# Patient Record
Sex: FEMALE | Race: BLACK OR AFRICAN AMERICAN | Employment: FULL TIME | URBAN - METROPOLITAN AREA
[De-identification: names, ages, dates, MRNs, and addresses within clinical notes are randomized per-mention and may not be internally consistent; named-entity substitution may affect disease eponyms.]

---

## 2017-07-08 ENCOUNTER — OFFICE VISIT (OUTPATIENT)
Dept: OBGYN CLINIC | Facility: CLINIC | Age: 57
End: 2017-07-08

## 2017-07-08 VITALS
HEART RATE: 76 BPM | WEIGHT: 203 LBS | HEIGHT: 63.5 IN | DIASTOLIC BLOOD PRESSURE: 71 MMHG | BODY MASS INDEX: 35.52 KG/M2 | SYSTOLIC BLOOD PRESSURE: 106 MMHG

## 2017-07-08 DIAGNOSIS — Z01.419 ENCOUNTER FOR GYNECOLOGICAL EXAMINATION WITHOUT ABNORMAL FINDING: Primary | ICD-10-CM

## 2017-07-08 DIAGNOSIS — Z12.31 VISIT FOR SCREENING MAMMOGRAM: ICD-10-CM

## 2017-07-08 PROCEDURE — 99396 PREV VISIT EST AGE 40-64: CPT | Performed by: OBSTETRICS & GYNECOLOGY

## 2017-07-08 NOTE — PROGRESS NOTES
Jazmin Fraser is a 49year old female [id number] No LMP recorded. Patient is postmenopausal. Patient presents with:  Gyn Exam: Annual and Mammo order  .     OBSTETRICS HISTORY:  OB History    Para Term  AB Living   7 7 7   7   SAB TAB Ectopic Mul current outpatient prescriptions on file.     ALLERGIES:    Sulfa Antibiotics             Review of Systems:  Constitutional:  Denies fatigue, night sweats, hot flashes  Eyes:  denies blurred or double vision  Cardiovascular:  denies chest pain or palpitati visit:    Encounter for gynecological examination without abnormal finding    Visit for screening mammogram  -     [facility] SCREENING BILAT (CPT=77067); Future      Next cotest 4/19. Mammogram ordered. Annual visits.

## 2017-07-31 ENCOUNTER — HOSPITAL ENCOUNTER (OUTPATIENT)
Dept: MAMMOGRAPHY | Facility: HOSPITAL | Age: 57
Discharge: HOME | End: 2017-07-31
Attending: OBSTETRICS & GYNECOLOGY
Payer: COMMERCIAL

## 2017-07-31 DIAGNOSIS — Z12.31 VISIT FOR SCREENING MAMMOGRAM: ICD-10-CM

## 2017-07-31 PROCEDURE — 77067 SCR MAMMO BI INCL CAD: CPT | Performed by: OBSTETRICS & GYNECOLOGY

## 2017-12-02 ENCOUNTER — LAB ENCOUNTER (OUTPATIENT)
Dept: LAB | Facility: HOSPITAL | Age: 57
End: 2017-12-02
Attending: INTERNAL MEDICINE
Payer: COMMERCIAL

## 2017-12-02 DIAGNOSIS — Z00.00 ANNUAL PHYSICAL EXAM: Primary | ICD-10-CM

## 2017-12-02 PROCEDURE — 85060 BLOOD SMEAR INTERPRETATION: CPT

## 2017-12-02 PROCEDURE — 80061 LIPID PANEL: CPT

## 2017-12-02 PROCEDURE — 85025 COMPLETE CBC W/AUTO DIFF WBC: CPT

## 2017-12-02 PROCEDURE — 84443 ASSAY THYROID STIM HORMONE: CPT

## 2017-12-02 PROCEDURE — 80053 COMPREHEN METABOLIC PANEL: CPT

## 2017-12-02 PROCEDURE — 36415 COLL VENOUS BLD VENIPUNCTURE: CPT

## 2018-07-07 ENCOUNTER — OFFICE VISIT (OUTPATIENT)
Dept: OBGYN CLINIC | Facility: CLINIC | Age: 58
End: 2018-07-07

## 2018-07-07 VITALS
DIASTOLIC BLOOD PRESSURE: 70 MMHG | BODY MASS INDEX: 29 KG/M2 | HEART RATE: 73 BPM | SYSTOLIC BLOOD PRESSURE: 108 MMHG | WEIGHT: 168 LBS

## 2018-07-07 DIAGNOSIS — Z12.31 VISIT FOR SCREENING MAMMOGRAM: ICD-10-CM

## 2018-07-07 DIAGNOSIS — Z01.419 ENCOUNTER FOR GYNECOLOGICAL EXAMINATION WITHOUT ABNORMAL FINDING: Primary | ICD-10-CM

## 2018-07-07 PROCEDURE — 99396 PREV VISIT EST AGE 40-64: CPT | Performed by: OBSTETRICS & GYNECOLOGY

## 2018-07-07 NOTE — PROGRESS NOTES
Ge Walters is a 56year old female [id number] No LMP recorded. Patient is postmenopausal. Patient presents with:  Gyn Exam: Annual, Mammogram order -- wishes for new PCP recs. No change in hx. No  bleed  .     OBSTETRICS HISTORY:  OB History    Pa Other      CAD, family h/o, F, M, S x 2       MEDICATIONS:  No current outpatient prescriptions on file.     ALLERGIES:    Sulfa Antibiotics             Review of Systems:  Constitutional:    denies fatigue, night sweats, hot flashes  Eyes:     denies blurr normal without masses or tenderness  Perineum:   normal  Anus: no hemorroids     Assessment & Plan:  Esthela Caban was seen today for gyn exam.    Diagnoses and all orders for this visit:    Encounter for gynecological examination without abnormal finding    Visi

## 2018-07-15 ENCOUNTER — APPOINTMENT (OUTPATIENT)
Dept: GENERAL RADIOLOGY | Age: 58
End: 2018-07-15
Attending: NURSE PRACTITIONER
Payer: COMMERCIAL

## 2018-07-15 ENCOUNTER — HOSPITAL ENCOUNTER (OUTPATIENT)
Age: 58
Discharge: HOME | End: 2018-07-15
Payer: COMMERCIAL

## 2018-07-15 VITALS
DIASTOLIC BLOOD PRESSURE: 63 MMHG | RESPIRATION RATE: 18 BRPM | TEMPERATURE: 97 F | SYSTOLIC BLOOD PRESSURE: 115 MMHG | OXYGEN SATURATION: 100 %

## 2018-07-15 DIAGNOSIS — S16.1XXA STRAIN OF NECK MUSCLE, INITIAL ENCOUNTER: ICD-10-CM

## 2018-07-15 DIAGNOSIS — S39.012A BACK STRAIN, INITIAL ENCOUNTER: ICD-10-CM
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
## 2018-07-15 PROCEDURE — 99214 OFFICE O/P EST MOD 30 MIN: CPT

## 2018-07-15 PROCEDURE — 72072 X-RAY EXAM THORAC SPINE 3VWS: CPT | Performed by: NURSE PRACTITIONER

## 2018-07-15 PROCEDURE — 99204 OFFICE O/P NEW MOD 45 MIN: CPT

## 2018-07-15 PROCEDURE — 72100 X-RAY EXAM L-S SPINE 2/3 VWS: CPT | Performed by: NURSE PRACTITIONER
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
## 2018-07-17 ENCOUNTER — OFFICE VISIT (OUTPATIENT)
Dept: FAMILY MEDICINE CLINIC | Facility: CLINIC | Age: 58
End: 2018-07-17
Payer: COMMERCIAL

## 2018-07-17 VITALS
BODY MASS INDEX: 28 KG/M2 | DIASTOLIC BLOOD PRESSURE: 60 MMHG | WEIGHT: 164 LBS | TEMPERATURE: 98 F | SYSTOLIC BLOOD PRESSURE: 93 MMHG | HEART RATE: 79 BPM

## 2018-07-17 DIAGNOSIS — M54.6 ACUTE BILATERAL THORACIC BACK PAIN: ICD-10-CM

## 2018-07-17 DIAGNOSIS — M54.41 ACUTE RIGHT-SIDED LOW BACK PAIN WITH RIGHT-SIDED SCIATICA: ICD-10-CM

## 2018-07-17 DIAGNOSIS — M54.2 CERVICALGIA: Primary | ICD-10-CM

## 2018-07-17 PROCEDURE — 99203 OFFICE O/P NEW LOW 30 MIN: CPT | Performed by: FAMILY MEDICINE

## 2018-07-17 PROCEDURE — 99212 OFFICE O/P EST SF 10 MIN: CPT | Performed by: FAMILY MEDICINE

## 2018-07-17 RX ORDER — METHYLPREDNISOLONE 4 MG/1
TABLET ORAL
Qty: 1 PACKAGE | Refills: 0 | Status: SHIPPED | OUTPATIENT
Start: 2018-07-17 | End: 2018-07-24 | Stop reason: ALTCHOICE

## 2018-07-17 RX ORDER — CYCLOBENZAPRINE HCL 10 MG
10 TABLET ORAL 3 TIMES DAILY
Qty: 30 TABLET | Refills: 1 | Status: SHIPPED | OUTPATIENT
Start: 2018-07-17 | End: 2018-08-06
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
## 2018-07-23 ENCOUNTER — TELEPHONE (OUTPATIENT)
Dept: OTHER | Age: 58
End: 2018-07-23

## 2018-07-23 NOTE — TELEPHONE ENCOUNTER
Patient was informed to keep the appointment tomorrow and Dr. Renetta Romero  Will address her medical issues in office.

## 2018-07-23 NOTE — TELEPHONE ENCOUNTER
pt was in to see you on 7/17. She stated that she has more neck and shoulder discomfort. She also has a headache and dizziness on/off a little worse then at the [facility]    She will like to know if she should continue with the medication you have prescribe

## 2018-07-24 ENCOUNTER — OFFICE VISIT (OUTPATIENT)
Dept: FAMILY MEDICINE CLINIC | Facility: CLINIC | Age: 58
End: 2018-07-24
Payer: COMMERCIAL

## 2018-07-24 VITALS
WEIGHT: 162 LBS | SYSTOLIC BLOOD PRESSURE: 91 MMHG | DIASTOLIC BLOOD PRESSURE: 59 MMHG | TEMPERATURE: 98 F | BODY MASS INDEX: 28 KG/M2 | HEART RATE: 78 BPM

## 2018-07-24 DIAGNOSIS — M54.2 CERVICALGIA: Primary | ICD-10-CM

## 2018-07-24 DIAGNOSIS — M54.6 ACUTE BILATERAL THORACIC BACK PAIN: ICD-10-CM

## 2018-07-24 PROCEDURE — 99214 OFFICE O/P EST MOD 30 MIN: CPT | Performed by: FAMILY MEDICINE

## 2018-07-24 PROCEDURE — 99212 OFFICE O/P EST SF 10 MIN: CPT | Performed by: FAMILY MEDICINE

## 2018-07-24 RX ORDER — HYDROCODONE BITARTRATE AND ACETAMINOPHEN 5; 325 MG/1; MG/1
1 TABLET ORAL EVERY 4 HOURS PRN
Qty: 30 TABLET | Refills: 0 | Status: SHIPPED | OUTPATIENT
Start: 2018-07-24 | End: 2018-08-13

## 2018-07-24 RX ORDER — NAPROXEN 500 MG/1
500 TABLET ORAL 2 TIMES DAILY WITH MEALS
Qty: 60 TABLET | Refills: 1 | Status: SHIPPED | OUTPATIENT
Start: 2018-07-24 | End: 2018-08-13

## 2018-07-24 NOTE — PROGRESS NOTES
HPI:    Patient ID: Jazmin Fraser is a 56year old female. HPI  Patient presents with:  Headache: tension headache with neck pain, back pain follow up     Review of Systems   Constitutional: Negative.     Musculoskeletal: Positive for back pain, neck radha

## 2018-08-06 ENCOUNTER — NURSE TRIAGE (OUTPATIENT)
Dept: OTHER | Age: 58
End: 2018-08-06

## 2018-08-06 NOTE — TELEPHONE ENCOUNTER
Action Requested: Summary for Provider     []  Critical Lab, Recommendations Needed  [] Need Additional Advice  []   FYI    [x]   Need Orders  [] Need Medications Sent to Pharmacy  []  Other     SUMMARY: Pt requesting MRI of neck and back.  Pt states seen i

## 2018-08-10 NOTE — TELEPHONE ENCOUNTER
LMTCB transfer to triage    Me   to Cirilo Tubbs          8/10/18 9:28 Xavi Lino, Dr Che Kapoor has stated that he would like to see you next week, please call [phone] to speak to a triage nurse. I called and left a phone message for you today.  Deja Gibbs

## 2018-08-10 NOTE — TELEPHONE ENCOUNTER
Patient stated that she was referred to Dr Kate Samuel only because her usual PCP wasn't available, but her usual PCP is now available and all her records are there and she'll follow-up with her PCP.

## 2018-08-12 ENCOUNTER — HOSPITAL ENCOUNTER (OUTPATIENT)
Dept: MAMMOGRAPHY | Facility: HOSPITAL | Age: 58
Discharge: HOME | End: 2018-08-12
Attending: OBSTETRICS & GYNECOLOGY
Payer: COMMERCIAL

## 2018-08-12 DIAGNOSIS — Z12.31 VISIT FOR SCREENING MAMMOGRAM: ICD-10-CM

## 2018-08-12 PROCEDURE — 77067 SCR MAMMO BI INCL CAD: CPT | Performed by: OBSTETRICS & GYNECOLOGY

## 2018-08-13 ENCOUNTER — OFFICE VISIT (OUTPATIENT)
Dept: OBGYN CLINIC | Facility: CLINIC | Age: 58
End: 2018-08-13
Payer: COMMERCIAL

## 2018-08-13 VITALS
SYSTOLIC BLOOD PRESSURE: 92 MMHG | BODY MASS INDEX: 28 KG/M2 | DIASTOLIC BLOOD PRESSURE: 62 MMHG | WEIGHT: 161 LBS | HEART RATE: 69 BPM
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
## 2018-08-13 DIAGNOSIS — R10.2 PELVIC PAIN IN FEMALE: Primary | ICD-10-CM

## 2018-08-13 PROCEDURE — 99213 OFFICE O/P EST LOW 20 MIN: CPT | Performed by: OBSTETRICS & GYNECOLOGY
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
## 2018-08-16 ENCOUNTER — OFFICE VISIT (OUTPATIENT)
Dept: INTERNAL MEDICINE CLINIC | Facility: CLINIC | Age: 58
End: 2018-08-16
Payer: COMMERCIAL

## 2018-08-16 VITALS
BODY MASS INDEX: 27.6 KG/M2 | DIASTOLIC BLOOD PRESSURE: 71 MMHG | TEMPERATURE: 99 F | HEART RATE: 91 BPM | HEIGHT: 64 IN | WEIGHT: 161.69 LBS | SYSTOLIC BLOOD PRESSURE: 103 MMHG

## 2018-08-16 DIAGNOSIS — S06.0X9S CONCUSSION WITH LOSS OF CONSCIOUSNESS, SEQUELA (HCC): ICD-10-CM
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
## 2018-08-16 DIAGNOSIS — Z00.00 ANNUAL PHYSICAL EXAM: Primary | ICD-10-CM
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
## 2018-08-16 PROCEDURE — 99386 PREV VISIT NEW AGE 40-64: CPT | Performed by: INTERNAL MEDICINE

## 2018-08-16 PROCEDURE — 99212 OFFICE O/P EST SF 10 MIN: CPT | Performed by: INTERNAL MEDICINE

## 2018-08-16 RX ORDER — CYCLOBENZAPRINE HCL 10 MG
10 TABLET ORAL 3 TIMES DAILY PRN
COMMUNITY
End: 2018-08-25 | Stop reason: ALTCHOICE
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
## 2018-08-16 NOTE — PATIENT INSTRUCTIONS
Concussion    A concussion can be caused by a direct blow to the head, neck, face, or somewhere else on the body with the force being transmitted to the head. This may cause you to lose consciousness – be \"knocked out\" - but not always.  Depending on th ? Don’t drink alcohol or take sedatives or medicines that make you sleepy. ? Don’t drive or operate machinery. ? Avoid doing anything strenuous. Don’t lift or strain.   · Don’t return to sports or any activity that could cause you to hit your head until a

## 2018-08-20 ENCOUNTER — APPOINTMENT (OUTPATIENT)
Dept: LAB | Facility: HOSPITAL | Age: 58
End: 2018-08-20
Attending: INTERNAL MEDICINE
Payer: COMMERCIAL

## 2018-08-20 LAB
ALBUMIN SERPL BCP-MCNC: 3.9 G/DL (ref 3.5–4.8)
ALBUMIN/GLOB SERPL: 1.3 {RATIO} (ref 1–2)
ALP SERPL-CCNC: 35 U/L (ref 32–100)
ALT SERPL-CCNC: 12 U/L (ref 14–54)
ANION GAP SERPL CALC-SCNC: 7 MMOL/L (ref 0–18)
AST SERPL-CCNC: 22 U/L (ref 15–41)
BASOPHILS # BLD: 0 K/UL (ref 0–0.2)
BASOPHILS NFR BLD: 1 %
BILIRUB SERPL-MCNC: 0.6 MG/DL (ref 0.3–1.2)
BILIRUB UR QL: NEGATIVE
BUN SERPL-MCNC: 10 MG/DL (ref 8–20)
BUN/CREAT SERPL: 12 (ref 10–20)
CALCIUM SERPL-MCNC: 9 MG/DL (ref 8.5–10.5)
CHLORIDE SERPL-SCNC: 105 MMOL/L (ref 95–110)
CHOLEST SERPL-MCNC: 144 MG/DL (ref 110–200)
CLARITY UR: CLEAR
CO2 SERPL-SCNC: 27 MMOL/L (ref 22–32)
COLOR UR: YELLOW
CREAT SERPL-MCNC: 0.83 MG/DL (ref 0.5–1.5)
EOSINOPHIL # BLD: 0 K/UL (ref 0–0.7)
EOSINOPHIL NFR BLD: 1 %
ERYTHROCYTE [DISTWIDTH] IN BLOOD BY AUTOMATED COUNT: 16.8 % (ref 11–15)
GLOBULIN PLAS-MCNC: 3.1 G/DL (ref 2.5–3.7)
GLUCOSE SERPL-MCNC: 85 MG/DL (ref 70–99)
GLUCOSE UR-MCNC: NEGATIVE MG/DL
HCT VFR BLD AUTO: 37.6 % (ref 35–48)
HDLC SERPL-MCNC: 63 MG/DL
HGB BLD-MCNC: 11.9 G/DL (ref 12–16)
HGB UR QL STRIP.AUTO: NEGATIVE
KETONES UR-MCNC: NEGATIVE MG/DL
LDLC SERPL CALC-MCNC: 74 MG/DL (ref 0–99)
LEUKOCYTE ESTERASE UR QL STRIP.AUTO: NEGATIVE
LYMPHOCYTES # BLD: 1.8 K/UL (ref 1–4)
LYMPHOCYTES NFR BLD: 47 %
MCH RBC QN AUTO: 22.3 PG (ref 27–32)
MCHC RBC AUTO-ENTMCNC: 31.7 G/DL (ref 32–37)
MCV RBC AUTO: 70.4 FL (ref 80–100)
MONOCYTES # BLD: 0.3 K/UL (ref 0–1)
MONOCYTES NFR BLD: 7 %
NEUTROPHILS # BLD AUTO: 1.6 K/UL (ref 1.8–7.7)
NEUTROPHILS NFR BLD: 43 %
NITRITE UR QL STRIP.AUTO: NEGATIVE
NONHDLC SERPL-MCNC: 81 MG/DL
OSMOLALITY UR CALC.SUM OF ELEC: 286 MOSM/KG (ref 275–295)
PATIENT FASTING: YES
PH UR: 5 [PH] (ref 5–8)
PLATELET # BLD AUTO: 222 K/UL (ref 140–400)
PMV BLD AUTO: 10.1 FL (ref 7.4–10.3)
POTASSIUM SERPL-SCNC: 3.6 MMOL/L (ref 3.3–5.1)
PROT SERPL-MCNC: 7 G/DL (ref 5.9–8.4)
PROT UR-MCNC: NEGATIVE MG/DL
RBC # BLD AUTO: 5.34 M/UL (ref 3.7–5.4)
SODIUM SERPL-SCNC: 139 MMOL/L (ref 136–144)
SP GR UR STRIP: 1.02 (ref 1–1.03)
TRIGL SERPL-MCNC: 37 MG/DL (ref 1–149)
TSH SERPL-ACNC: 0.7 UIU/ML (ref 0.45–5.33)
UROBILINOGEN UR STRIP-ACNC: <2
VIT C UR-MCNC: NEGATIVE MG/DL
WBC # BLD AUTO: 3.7 K/UL (ref 4–11)

## 2018-08-20 PROCEDURE — 80061 LIPID PANEL: CPT | Performed by: INTERNAL MEDICINE

## 2018-08-20 PROCEDURE — 36415 COLL VENOUS BLD VENIPUNCTURE: CPT | Performed by: INTERNAL MEDICINE

## 2018-08-20 PROCEDURE — 85025 COMPLETE CBC W/AUTO DIFF WBC: CPT | Performed by: INTERNAL MEDICINE

## 2018-08-20 PROCEDURE — 84443 ASSAY THYROID STIM HORMONE: CPT | Performed by: INTERNAL MEDICINE

## 2018-08-20 PROCEDURE — 81003 URINALYSIS AUTO W/O SCOPE: CPT | Performed by: INTERNAL MEDICINE

## 2018-08-20 PROCEDURE — 80053 COMPREHEN METABOLIC PANEL: CPT | Performed by: INTERNAL MEDICINE

## 2018-08-22 ENCOUNTER — HOSPITAL ENCOUNTER (OUTPATIENT)
Dept: CT IMAGING | Facility: HOSPITAL | Age: 58
Discharge: HOME | End: 2018-08-22
Attending: INTERNAL MEDICINE
Payer: COMMERCIAL

## 2018-08-22 ENCOUNTER — HOSPITAL ENCOUNTER (OUTPATIENT)
Dept: ULTRASOUND IMAGING | Facility: HOSPITAL | Age: 58
Discharge: HOME | End: 2018-08-22
Attending: OBSTETRICS & GYNECOLOGY
Payer: COMMERCIAL

## 2018-08-22 DIAGNOSIS — R10.2 PELVIC PAIN: ICD-10-CM
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
## 2018-08-22 PROCEDURE — 70450 CT HEAD/BRAIN W/O DYE: CPT | Performed by: INTERNAL MEDICINE

## 2018-08-22 PROCEDURE — 76830 TRANSVAGINAL US NON-OB: CPT | Performed by: OBSTETRICS & GYNECOLOGY

## 2018-08-22 PROCEDURE — 76856 US EXAM PELVIC COMPLETE: CPT | Performed by: OBSTETRICS & GYNECOLOGY

## 2018-08-23 ENCOUNTER — TELEPHONE (OUTPATIENT)
Dept: OTHER | Age: 58
End: 2018-08-23

## 2018-08-23 NOTE — TELEPHONE ENCOUNTER
Clinical Staff: Patient called to  report CT brain this morning. Please place at  for .   (print dated 8/22/18)    Patient Has appt today with Neurologist at [facility], 2:30pm.     She will call medical records for CD imaging.

## 2018-08-25 ENCOUNTER — OFFICE VISIT (OUTPATIENT)
Dept: INTERNAL MEDICINE CLINIC | Facility: CLINIC | Age: 58
End: 2018-08-25
Payer: COMMERCIAL

## 2018-08-25 VITALS
TEMPERATURE: 99 F | WEIGHT: 163.69 LBS | BODY MASS INDEX: 27.95 KG/M2 | DIASTOLIC BLOOD PRESSURE: 68 MMHG | SYSTOLIC BLOOD PRESSURE: 99 MMHG | HEART RATE: 75 BPM | HEIGHT: 64 IN

## 2018-08-25 DIAGNOSIS — F07.81 POST CONCUSSION SYNDROME: ICD-10-CM
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
## 2018-08-25 DIAGNOSIS — R00.2 PALPITATION: ICD-10-CM

## 2018-08-25 PROBLEM — D56.3 THALASSEMIA TRAIT: Status: ACTIVE | Noted: 2018-08-25

## 2018-08-25 PROCEDURE — 99212 OFFICE O/P EST SF 10 MIN: CPT | Performed by: INTERNAL MEDICINE

## 2018-08-25 PROCEDURE — 99214 OFFICE O/P EST MOD 30 MIN: CPT | Performed by: INTERNAL MEDICINE

## 2018-08-25 NOTE — ASSESSMENT & PLAN NOTE
She has wide array of symptoms like fatigue, dizziness, tinnitus, nausea, headaches, imbalance, weakness of the extremities and diffuse muscle pain.   She was evaluated by neurologist at [facility].  Her CT scan showed empty sella, she has
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
## 2018-08-25 NOTE — ASSESSMENT & PLAN NOTE
Reports heart racing every time she is about to do an activity. Denies any chest pain or shortness of breath. We will get baseline EKG. Advised the patient to avoid anxiety and stress. If any abnormalities or if it persists, consider Holter monitoring.

## 2018-09-17 ENCOUNTER — TELEPHONE (OUTPATIENT)
Dept: OTHER | Age: 58
End: 2018-09-17
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
## 2018-09-18 NOTE — TELEPHONE ENCOUNTER
Spoke with patient and told her parking placard will be in the  at [facility]. Patient verbalized understanding.

## 2018-10-04 ENCOUNTER — TELEPHONE (OUTPATIENT)
Dept: OTHER | Age: 58
End: 2018-10-04

## 2018-10-04 ENCOUNTER — OFFICE VISIT (OUTPATIENT)
Dept: INTERNAL MEDICINE CLINIC | Facility: CLINIC | Age: 58
End: 2018-10-04
Payer: COMMERCIAL

## 2018-10-04 ENCOUNTER — TELEPHONE (OUTPATIENT)
Dept: INTERNAL MEDICINE CLINIC | Facility: CLINIC | Age: 58
End: 2018-10-04

## 2018-10-04 VITALS
HEIGHT: 64 IN | SYSTOLIC BLOOD PRESSURE: 101 MMHG | TEMPERATURE: 98 F | BODY MASS INDEX: 28.8 KG/M2 | WEIGHT: 168.69 LBS | DIASTOLIC BLOOD PRESSURE: 70 MMHG | HEART RATE: 98 BPM

## 2018-10-04 DIAGNOSIS — F07.81 POST CONCUSSION SYNDROME: ICD-10-CM

## 2018-10-04 DIAGNOSIS — N30.01 ACUTE CYSTITIS WITH HEMATURIA: Primary | ICD-10-CM

## 2018-10-04 DIAGNOSIS — M47.22 OSTEOARTHRITIS OF SPINE WITH RADICULOPATHY, CERVICAL REGION: ICD-10-CM

## 2018-10-04 PROCEDURE — 99214 OFFICE O/P EST MOD 30 MIN: CPT | Performed by: INTERNAL MEDICINE

## 2018-10-04 PROCEDURE — 99212 OFFICE O/P EST SF 10 MIN: CPT | Performed by: INTERNAL MEDICINE

## 2018-10-04 RX ORDER — NITROFURANTOIN 25; 75 MG/1; MG/1
100 CAPSULE ORAL 2 TIMES DAILY
Qty: 10 CAPSULE | Refills: 0 | Status: SHIPPED | OUTPATIENT
Start: 2018-10-04 | End: 2019-02-18 | Stop reason: ALTCHOICE

## 2018-10-04 NOTE — TELEPHONE ENCOUNTER
Patient filled out HIPPA form and Paid $25. Patient requests that she picks up forms when they are ready. She will be the one dropping them off.
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
## 2018-10-04 NOTE — PROGRESS NOTES
Annamarie Dia is a 66year old female.   Patient presents with:  Complete Form: patient would like to talk in regards to FMLA forms  Urinary: pt c/o of blood in urine, on and off urinary frequency      HPI:     HPI   Patient is here to request for FMLA nettie Outpatient Medications:  Nitrofurantoin Monohyd Macro 100 MG Oral Cap Take 1 capsule (100 mg total) by mouth 2 (two) times daily.  Disp: 10 capsule Rfl: 0      Past Medical History:   Diagnosis Date   • Depression       Past Surgical History:   Procedure La right inguinal area and confirmed negative in the left inguinal area. Skin: No rash noted.          ASSESSMENT AND PLAN:       Diagnoses and all orders for this visit:    Acute cystitis with hematuria  Point-of-care urine test positive for nitrates, leuko

## 2018-10-05 NOTE — TELEPHONE ENCOUNTER
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 Need to inform for addl $25. Logged for processing.  NK

## 2018-10-16 NOTE — TELEPHONE ENCOUNTER
Dr. Nannette Russo     2 forms below both require electronic signature please. #1  FMLA   Consecutive leave 7-25-18 to 9-24-18 with addl intermittent leave at 1 to 3 days per mo. Certification will end in December.   Pt states she is still in physical therapy a

## 2018-10-18 NOTE — TELEPHONE ENCOUNTER
Patient stating is returning [facility] aand is also concerned about her FMLA papers which have not filled out yet. She would like them filled out.    I instructed to call the [employer] department but she stated all she gets is a rec

## 2018-10-19 NOTE — TELEPHONE ENCOUNTER
Forms completed signed by provider. Both emailed to pt secure, also aorig mailed to her home address. Pt billed for second charge $15.00 pt informed.  BALBINA

## 2019-02-18 ENCOUNTER — OFFICE VISIT (OUTPATIENT)
Dept: INTERNAL MEDICINE CLINIC | Facility: CLINIC | Age: 59
End: 2019-02-18
Payer: COMMERCIAL

## 2019-02-18 VITALS
WEIGHT: 181.19 LBS | HEART RATE: 74 BPM | TEMPERATURE: 98 F | SYSTOLIC BLOOD PRESSURE: 113 MMHG | BODY MASS INDEX: 30.93 KG/M2 | DIASTOLIC BLOOD PRESSURE: 74 MMHG | HEIGHT: 64 IN

## 2019-02-18 DIAGNOSIS — M47.816 OSTEOARTHRITIS OF LUMBAR SPINE, UNSPECIFIED SPINAL OSTEOARTHRITIS COMPLICATION STATUS: ICD-10-CM

## 2019-02-18 DIAGNOSIS — M48.02 SPINAL STENOSIS IN CERVICAL REGION: Primary | ICD-10-CM

## 2019-02-18 PROCEDURE — 99212 OFFICE O/P EST SF 10 MIN: CPT | Performed by: INTERNAL MEDICINE

## 2019-02-18 PROCEDURE — 99213 OFFICE O/P EST LOW 20 MIN: CPT | Performed by: INTERNAL MEDICINE
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
## 2019-09-07 ENCOUNTER — OFFICE VISIT (OUTPATIENT)
Dept: OBGYN CLINIC | Facility: CLINIC | Age: 59
End: 2019-09-07
Payer: COMMERCIAL

## 2019-09-07 VITALS
SYSTOLIC BLOOD PRESSURE: 118 MMHG | WEIGHT: 188 LBS | BODY MASS INDEX: 32.1 KG/M2 | DIASTOLIC BLOOD PRESSURE: 82 MMHG | HEIGHT: 64 IN

## 2019-09-07 DIAGNOSIS — Z01.419 ENCOUNTER FOR WELL WOMAN EXAM: Primary | ICD-10-CM

## 2019-09-07 DIAGNOSIS — Z12.31 VISIT FOR SCREENING MAMMOGRAM: ICD-10-CM

## 2019-09-07 PROCEDURE — 99396 PREV VISIT EST AGE 40-64: CPT | Performed by: OBSTETRICS & GYNECOLOGY

## 2019-09-08 NOTE — PROGRESS NOTES
Marco Ling is a 66year old female [id number] No LMP recorded. Patient is postmenopausal. Patient presents with:  Gyn Exam: Annual -- no change in hx / FHx. no  bleed  .     OBSTETRICS HISTORY:  OB History    Para Term  AB Living   7 7 7 Minutes per session: Not on file      Stress: Not on file    Relationships      Social connections:        Talks on phone: Not on file        Gets together: Not on file        Attends Taoism service: Not on file        Active member of club or organizat harming self or others  Heme/Lymph:    denies easy bruising or bleeding      PHYSICAL EXAM:   Blood pressure 118/82, height 5' 4\" (1.626 m), weight 188 lb (85.3 kg), not currently breastfeeding.   Constitutional:  well developed, well nourished  Head/Face: bleeding. Encouraged 1500 mg calcium w/ vit D. Encouraged weight bearing exercise. Follow-up in one year.

## 2019-09-09 LAB — HPV I/H RISK 1 DNA SPEC QL NAA+PROBE: NEGATIVE

## 2019-09-11 LAB
LAST PAP RESULT: NORMAL
PAP HISTORY (OTHER THAN LAST PAP): NORMAL

## 2019-09-26 ENCOUNTER — HOSPITAL ENCOUNTER (OUTPATIENT)
Dept: MAMMOGRAPHY | Facility: HOSPITAL | Age: 59
Discharge: HOME | End: 2019-09-26
Attending: OBSTETRICS & GYNECOLOGY
Payer: COMMERCIAL

## 2019-09-26 DIAGNOSIS — Z12.31 VISIT FOR SCREENING MAMMOGRAM: ICD-10-CM

## 2019-09-26 PROCEDURE — 77067 SCR MAMMO BI INCL CAD: CPT | Performed by: OBSTETRICS & GYNECOLOGY

## 2019-09-26 PROCEDURE — 77063 BREAST TOMOSYNTHESIS BI: CPT | Performed by: OBSTETRICS & GYNECOLOGY

## 2019-09-27 ENCOUNTER — TELEPHONE (OUTPATIENT)
Dept: INTERNAL MEDICINE CLINIC | Facility: CLINIC | Age: 59
End: 2019-09-27

## 2019-09-27 ENCOUNTER — OFFICE VISIT (OUTPATIENT)
Dept: INTERNAL MEDICINE CLINIC | Facility: CLINIC | Age: 59
End: 2019-09-27
Payer: COMMERCIAL

## 2019-09-27 VITALS
HEART RATE: 80 BPM | DIASTOLIC BLOOD PRESSURE: 68 MMHG | SYSTOLIC BLOOD PRESSURE: 106 MMHG | BODY MASS INDEX: 32.67 KG/M2 | HEIGHT: 64 IN | WEIGHT: 191.38 LBS

## 2019-09-27 DIAGNOSIS — M47.816 SPONDYLOSIS OF LUMBAR REGION WITHOUT MYELOPATHY OR RADICULOPATHY: ICD-10-CM

## 2019-09-27 DIAGNOSIS — M48.02 SPINAL STENOSIS OF CERVICAL REGION: Primary | ICD-10-CM

## 2019-09-27 DIAGNOSIS — Z00.00 PHYSICAL EXAM: Primary | ICD-10-CM

## 2019-09-27 DIAGNOSIS — M51.34 THORACIC DEGENERATIVE DISC DISEASE: ICD-10-CM

## 2019-09-27 PROCEDURE — 99213 OFFICE O/P EST LOW 20 MIN: CPT | Performed by: INTERNAL MEDICINE

## 2019-09-27 NOTE — PATIENT INSTRUCTIONS
Back Care Tips    Caring for your back  These are things you can do to prevent a recurrence of acute back pain and to reduce symptoms from chronic back pain:  · Stay at a healthy weight.  If you are overweight, losing weight will help most types of back p · Be careful if you are given prescription pain medicines, opioids, or medicine for muscle spasm. They can cause drowsiness, and affect your coordination, reflexes, and judgment. Don't drive or operate heavy machinery while taking these types of medicines. The best way to sleep is on your side with your knees bent. Put a low pillow under your head to support your neck in a neutral spine position. Don't use thick pillows that bend your neck to one side.  Put a pillow between your legs to further relax your low The spine is made of bones (vertebrae) and pads of soft tissue (disks). These parts are arranged in three curves: cervical, thoracic, and lumbar. When properly aligned, these curves keep your body balanced. They also support your body when you move.  By dis Acute neck and back pain usually gets better in 1 to 2 weeks. Pain related to disk disease, arthritis in the spinal joints or spinal stenosis (narrowing of the spinal canal) can become chronic and last for months or years.   Back and neck pain are common pr · When in bed, try to find a position of comfort. A firm mattress is best. Try lying flat on your back with pillows under your knees. You can also try lying on your side with your knees bent up towards your chest and a pillow between your knees.   · At [facility] Follow up with your healthcare provider, or as advised. Physical therapy or further tests may be needed. If X-rays were taken, you will be notified of any new findings that may affect your care.   Call 911  Call 911 if any of the following occur:  · Troubl

## 2019-09-27 NOTE — TELEPHONE ENCOUNTER
Pt stated she had a px today. Pt wondering if annual labs would be ordered. Please call pt to advise.
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
## 2019-09-28 NOTE — TELEPHONE ENCOUNTER
Pt was seen as an established care -- pcp dr Satcy Santoro   Will order labs --pls ask her ot get it done once fasting -- no food or drinks except water for 8 hrs -- I think she needs to get this drawn at [facility] --printed it out --pls ask her if she would like to pick it up or have it mailed back

## 2019-10-11 ENCOUNTER — APPOINTMENT (OUTPATIENT)
Dept: LAB | Facility: HOSPITAL | Age: 59
End: 2019-10-11
Attending: INTERNAL MEDICINE
Payer: COMMERCIAL

## 2019-10-11 LAB
ALBUMIN SERPL-MCNC: 3.5 G/DL (ref 3.4–5)
ALBUMIN/GLOB SERPL: 0.9 {RATIO} (ref 1–2)
ALP LIVER SERPL-CCNC: 58 U/L (ref 46–118)
ALT SERPL-CCNC: 19 U/L (ref 13–56)
ANION GAP SERPL CALC-SCNC: 4 MMOL/L (ref 0–18)
AST SERPL-CCNC: 25 U/L (ref 15–37)
BASOPHILS # BLD AUTO: 0.05 X10(3) UL (ref 0–0.2)
BASOPHILS NFR BLD AUTO: 0.8 %
BILIRUB SERPL-MCNC: 0.4 MG/DL (ref 0.1–2)
BUN BLD-MCNC: 15 MG/DL (ref 7–18)
BUN/CREAT SERPL: 16.7 (ref 10–20)
CALCIUM BLD-MCNC: 8.7 MG/DL (ref 8.5–10.1)
CHLORIDE SERPL-SCNC: 109 MMOL/L (ref 98–112)
CHOLEST SMN-MCNC: 136 MG/DL (ref ?–200)
CO2 SERPL-SCNC: 30 MMOL/L (ref 21–32)
CREAT BLD-MCNC: 0.9 MG/DL (ref 0.55–1.02)
DEPRECATED RDW RBC AUTO: 43.3 FL (ref 35.1–46.3)
EOSINOPHIL # BLD AUTO: 0.1 X10(3) UL (ref 0–0.7)
EOSINOPHIL NFR BLD AUTO: 1.7 %
ERYTHROCYTE [DISTWIDTH] IN BLOOD BY AUTOMATED COUNT: 16.6 % (ref 11–15)
GLOBULIN PLAS-MCNC: 3.8 G/DL (ref 2.8–4.4)
GLUCOSE BLD-MCNC: 94 MG/DL (ref 70–99)
HCT VFR BLD AUTO: 38.7 % (ref 35–48)
HDLC SERPL-MCNC: 71 MG/DL (ref 40–59)
HGB BLD-MCNC: 11.8 G/DL (ref 12–16)
IMM GRANULOCYTES # BLD AUTO: 0.02 X10(3) UL (ref 0–1)
IMM GRANULOCYTES NFR BLD: 0.3 %
LDLC SERPL CALC-MCNC: 57 MG/DL (ref ?–100)
LYMPHOCYTES # BLD AUTO: 2.11 X10(3) UL (ref 1–4)
LYMPHOCYTES NFR BLD AUTO: 35.3 %
M PROTEIN MFR SERPL ELPH: 7.3 G/DL (ref 6.4–8.2)
MCH RBC QN AUTO: 22.6 PG (ref 26–34)
MCHC RBC AUTO-ENTMCNC: 30.5 G/DL (ref 31–37)
MCV RBC AUTO: 74.1 FL (ref 80–100)
MONOCYTES # BLD AUTO: 0.52 X10(3) UL (ref 0.1–1)
MONOCYTES NFR BLD AUTO: 8.7 %
NEUTROPHILS # BLD AUTO: 3.18 X10 (3) UL (ref 1.5–7.7)
NEUTROPHILS # BLD AUTO: 3.18 X10(3) UL (ref 1.5–7.7)
NEUTROPHILS NFR BLD AUTO: 53.2 %
NONHDLC SERPL-MCNC: 65 MG/DL (ref ?–130)
OSMOLALITY SERPL CALC.SUM OF ELEC: 297 MOSM/KG (ref 275–295)
PATIENT FASTING: YES
PATIENT FASTING: YES
PLATELET # BLD AUTO: 246 10(3)UL (ref 150–450)
POTASSIUM SERPL-SCNC: 4.4 MMOL/L (ref 3.5–5.1)
RBC # BLD AUTO: 5.22 X10(6)UL (ref 3.8–5.3)
SODIUM SERPL-SCNC: 143 MMOL/L (ref 136–145)
TRIGL SERPL-MCNC: 38 MG/DL (ref 30–149)
TSI SER-ACNC: 1.02 MIU/ML (ref 0.36–3.74)
VLDLC SERPL CALC-MCNC: 8 MG/DL (ref 0–30)
WBC # BLD AUTO: 6 X10(3) UL (ref 4–11)

## 2019-10-11 PROCEDURE — 36415 COLL VENOUS BLD VENIPUNCTURE: CPT | Performed by: INTERNAL MEDICINE

## 2019-10-11 PROCEDURE — 80061 LIPID PANEL: CPT | Performed by: INTERNAL MEDICINE

## 2019-10-11 PROCEDURE — 85025 COMPLETE CBC W/AUTO DIFF WBC: CPT | Performed by: INTERNAL MEDICINE

## 2019-10-11 PROCEDURE — 80053 COMPREHEN METABOLIC PANEL: CPT | Performed by: INTERNAL MEDICINE

## 2019-10-11 PROCEDURE — 84443 ASSAY THYROID STIM HORMONE: CPT | Performed by: INTERNAL MEDICINE

## 2019-11-11 ENCOUNTER — OFFICE VISIT (OUTPATIENT)
Dept: NEUROLOGY | Facility: CLINIC | Age: 59
End: 2019-11-11
Payer: COMMERCIAL

## 2019-11-11 VITALS
DIASTOLIC BLOOD PRESSURE: 74 MMHG | RESPIRATION RATE: 18 BRPM | HEART RATE: 72 BPM | WEIGHT: 191 LBS | SYSTOLIC BLOOD PRESSURE: 108 MMHG | BODY MASS INDEX: 32.61 KG/M2 | HEIGHT: 64 IN

## 2019-11-11 DIAGNOSIS — G89.29 CHRONIC NECK PAIN: Primary | ICD-10-CM

## 2019-11-11 DIAGNOSIS — M48.02 SPINAL STENOSIS OF CERVICAL REGION: ICD-10-CM

## 2019-11-11 DIAGNOSIS — F07.81 POST CONCUSSION SYNDROME: ICD-10-CM

## 2019-11-11 DIAGNOSIS — M47.812 SPONDYLOSIS OF CERVICAL SPINE: ICD-10-CM

## 2019-11-11 DIAGNOSIS — M54.2 CHRONIC NECK PAIN: Primary | ICD-10-CM

## 2019-11-11 PROCEDURE — 99244 OFF/OP CNSLTJ NEW/EST MOD 40: CPT | Performed by: PHYSICAL MEDICINE & REHABILITATION

## 2019-11-11 RX ORDER — MELOXICAM 15 MG/1
15 TABLET ORAL DAILY
Qty: 14 TABLET | Refills: 0 | Status: SHIPPED | OUTPATIENT
Start: 2019-11-11 | End: 2019-11-25
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
## 2019-11-11 NOTE — PATIENT INSTRUCTIONS
-Consider trigger point injections  -Start Mobic daily for the next 7-10 days  -See integrative medicine  -Look into cupping/accupunture  -Continue ice/heat as tolerated  -Continue home exercises  -Follow up in 4 weeks

## 2020-01-20 ENCOUNTER — LAB ENCOUNTER (OUTPATIENT)
Dept: LAB | Age: 60
End: 2020-01-20
Attending: INTERNAL MEDICINE
Payer: COMMERCIAL

## 2020-01-20 ENCOUNTER — OFFICE VISIT (OUTPATIENT)
Dept: INTERNAL MEDICINE CLINIC | Facility: CLINIC | Age: 60
End: 2020-01-20
Payer: COMMERCIAL

## 2020-01-20 VITALS
WEIGHT: 196 LBS | HEART RATE: 76 BPM | DIASTOLIC BLOOD PRESSURE: 72 MMHG | BODY MASS INDEX: 33.46 KG/M2 | TEMPERATURE: 97 F | HEIGHT: 64 IN | SYSTOLIC BLOOD PRESSURE: 114 MMHG

## 2020-01-20 DIAGNOSIS — Z01.84 IMMUNITY STATUS TESTING: Primary | ICD-10-CM

## 2020-01-20 DIAGNOSIS — Z01.84 IMMUNITY TO MEASLES, MUMPS, AND RUBELLA DETERMINED BY SEROLOGIC TEST: Primary | ICD-10-CM

## 2020-01-20 DIAGNOSIS — Z11.1 SCREENING FOR TUBERCULOSIS: ICD-10-CM

## 2020-01-20 LAB
RUBV IGG SER QL: POSITIVE
RUBV IGG SER-ACNC: 50.9 IU/ML (ref 10–?)

## 2020-01-20 PROCEDURE — 86735 MUMPS ANTIBODY: CPT

## 2020-01-20 PROCEDURE — 86765 RUBEOLA ANTIBODY: CPT

## 2020-01-20 PROCEDURE — 86762 RUBELLA ANTIBODY: CPT

## 2020-01-20 PROCEDURE — 99213 OFFICE O/P EST LOW 20 MIN: CPT | Performed by: INTERNAL MEDICINE

## 2020-01-20 PROCEDURE — 86480 TB TEST CELL IMMUN MEASURE: CPT

## 2020-01-20 PROCEDURE — 36415 COLL VENOUS BLD VENIPUNCTURE: CPT
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
## 2020-01-22 LAB
M TB IFN-G CD4+ T-CELLS BLD-ACNC: 0.04 IU/ML
M TB TUBERC IFN-G BLD QL: NEGATIVE
M TB TUBERC IGNF/MITOGEN IGNF CONTROL: >10 IU/ML
MEV IGG SER-ACNC: >300 AU/ML (ref 16.5–?)
MUV IGG SER IA-ACNC: 290 AU/ML (ref 11–?)
QUANTIFERON TB1 MINUS NIL: 0 IU/ML
QUANTIFERON TB2 MINUS NIL: 0 IU/ML

## 2020-03-24 ENCOUNTER — PATIENT MESSAGE (OUTPATIENT)
Dept: INTERNAL MEDICINE CLINIC | Facility: CLINIC | Age: 60
End: 2020-03-24

## 2020-03-24 ENCOUNTER — NURSE TRIAGE (OUTPATIENT)
Dept: OTHER | Age: 60
End: 2020-03-24

## 2020-03-24 DIAGNOSIS — R05.9 COUGH: Primary | ICD-10-CM

## 2020-03-24 PROCEDURE — 99441 PHONE E/M BY PHYS 5-10 MIN: CPT | Performed by: INTERNAL MEDICINE

## 2020-03-24 NOTE — TELEPHONE ENCOUNTER
----- Message from Niurka Cárdenas. Martha Beech sent at 3/24/2020 12:09 PM CDT -----  Regarding: Other  Contact: [phone]  I have been experiencing congestion in my chest and throat. I checked, no temperature but feel feverish and tired.    I would say this congestio Distal radius fracture

## 2020-03-24 NOTE — TELEPHONE ENCOUNTER
Telemedicine Note    Virtual/Telephone Check-In    Roseann Hogan verbally consents to a Virtual/Telephone Check-In service on 03/24/20.  Patient understands and accepts financial responsibility for any deductible, co-insurance and/or co-pays associated with cervical spine    No current outpatient medications on file. Summary of topics discussed/ diagnosis:  1.  Cough    Can try otc cough meds , tylenol if needed    Call back when she is 100% better and without any cough or any other symptoms for at least

## 2020-03-24 NOTE — TELEPHONE ENCOUNTER
From: Akilah Lofton  To: Socrates Bledsoe MD  Sent: 3/24/2020 12:09 PM CDT  Subject: Other    I have been experiencing congestion in my chest and throat. I checked, no temperature but feel feverish and tired.  I would say this congestion has been with me for s

## 2020-03-24 NOTE — TELEPHONE ENCOUNTER
Action Requested: Summary for Provider     []  Critical Lab, Recommendations Needed  [] Need Additional Advice  []   FYI    []   Need Orders  [] Need Medications Sent to Pharmacy  []  Other     SUMMARY: Pt c/o cough chest congestion x 2 weeks has called in

## 2020-08-21 ENCOUNTER — OFFICE VISIT (OUTPATIENT)
Dept: INTERNAL MEDICINE CLINIC | Facility: CLINIC | Age: 60
End: 2020-08-21
Payer: COMMERCIAL

## 2020-08-21 VITALS
SYSTOLIC BLOOD PRESSURE: 103 MMHG | BODY MASS INDEX: 32.95 KG/M2 | HEART RATE: 62 BPM | HEIGHT: 64 IN | TEMPERATURE: 98 F | WEIGHT: 193 LBS | DIASTOLIC BLOOD PRESSURE: 68 MMHG

## 2020-08-21 DIAGNOSIS — I83.813 VARICOSE VEINS OF BOTH LOWER EXTREMITIES WITH PAIN: Primary | ICD-10-CM

## 2020-08-21 PROCEDURE — 3074F SYST BP LT 130 MM HG: CPT | Performed by: INTERNAL MEDICINE

## 2020-08-21 PROCEDURE — 3008F BODY MASS INDEX DOCD: CPT | Performed by: INTERNAL MEDICINE

## 2020-08-21 PROCEDURE — 3078F DIAST BP <80 MM HG: CPT | Performed by: INTERNAL MEDICINE

## 2020-08-21 PROCEDURE — 99213 OFFICE O/P EST LOW 20 MIN: CPT | Performed by: INTERNAL MEDICINE
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
## 2020-09-08 ENCOUNTER — TELEPHONE (OUTPATIENT)
Dept: OBGYN CLINIC | Facility: CLINIC | Age: 60
End: 2020-09-08

## 2020-09-08 ENCOUNTER — LAB ENCOUNTER (OUTPATIENT)
Dept: LAB | Facility: HOSPITAL | Age: 60
End: 2020-09-08
Attending: OBSTETRICS & GYNECOLOGY
Payer: COMMERCIAL

## 2020-09-08 DIAGNOSIS — R35.0 URINARY FREQUENCY: ICD-10-CM

## 2020-09-08 DIAGNOSIS — R39.15 URGENCY OF URINATION: ICD-10-CM

## 2020-09-08 DIAGNOSIS — R30.0 DYSURIA: Primary | ICD-10-CM

## 2020-09-08 DIAGNOSIS — R30.0 DYSURIA: ICD-10-CM

## 2020-09-08 LAB
BILIRUB UR QL: NEGATIVE
COLOR UR: YELLOW
GLUCOSE UR-MCNC: NEGATIVE MG/DL
KETONES UR-MCNC: NEGATIVE MG/DL
NITRITE UR QL STRIP.AUTO: NEGATIVE
PH UR: 6 [PH] (ref 5–8)
PROT UR-MCNC: 100 MG/DL
RBC #/AREA URNS AUTO: 8 /HPF
SP GR UR STRIP: 1.01 (ref 1–1.03)
UROBILINOGEN UR STRIP-ACNC: <2
WBC #/AREA URNS AUTO: 382 /HPF

## 2020-09-08 PROCEDURE — 87086 URINE CULTURE/COLONY COUNT: CPT

## 2020-09-08 PROCEDURE — 81001 URINALYSIS AUTO W/SCOPE: CPT

## 2020-09-08 NOTE — TELEPHONE ENCOUNTER
Pt reports dysuria, urgency, and frequency x1 day. Pt reports \"a little pain under left ribcage. \" Pt reports while urinating \"it feels like cramping only for a few seconds\" to pelvic area. Pt requesting Rx for UTI.  Advised pt she needs to go to lab to

## 2020-09-09 NOTE — TELEPHONE ENCOUNTER
PT NOTIFIED WE WILL CALL HER BACK ONCE [facility] REVIEWS THE RESULT AND PRESCRIBES. PHARMACY CONFIRMED. ALLERGIC TO [facility].

## 2020-09-10 NOTE — TELEPHONE ENCOUNTER
Pt informed of recs. States symptoms have resolved. Pt advised to call if they return. Pt asked to schedule annual with [facility]. Appt made on 10/9/2020.

## 2020-09-10 NOTE — TELEPHONE ENCOUNTER
If still w/ Sx then macrobid bid x 7d (u/a w/ xs wbc). If no Sx, then no need to treat further since cx negative.

## 2020-10-09 ENCOUNTER — OFFICE VISIT (OUTPATIENT)
Dept: INTERNAL MEDICINE CLINIC | Facility: CLINIC | Age: 60
End: 2020-10-09
Payer: COMMERCIAL

## 2020-10-09 ENCOUNTER — OFFICE VISIT (OUTPATIENT)
Dept: OBGYN CLINIC | Facility: CLINIC | Age: 60
End: 2020-10-09
Payer: COMMERCIAL

## 2020-10-09 VITALS
DIASTOLIC BLOOD PRESSURE: 70 MMHG | SYSTOLIC BLOOD PRESSURE: 106 MMHG | WEIGHT: 195 LBS | BODY MASS INDEX: 33.29 KG/M2 | HEART RATE: 77 BPM | RESPIRATION RATE: 16 BRPM | HEIGHT: 64 IN

## 2020-10-09 VITALS
DIASTOLIC BLOOD PRESSURE: 76 MMHG | HEART RATE: 64 BPM | BODY MASS INDEX: 33 KG/M2 | SYSTOLIC BLOOD PRESSURE: 112 MMHG | WEIGHT: 192.38 LBS

## 2020-10-09 DIAGNOSIS — E66.09 CLASS 1 OBESITY DUE TO EXCESS CALORIES WITHOUT SERIOUS COMORBIDITY WITH BODY MASS INDEX (BMI) OF 33.0 TO 33.9 IN ADULT: ICD-10-CM

## 2020-10-09 DIAGNOSIS — Z00.00 PHYSICAL EXAM: Primary | ICD-10-CM

## 2020-10-09 DIAGNOSIS — Z12.31 VISIT FOR SCREENING MAMMOGRAM: ICD-10-CM

## 2020-10-09 DIAGNOSIS — Z01.419 ENCOUNTER FOR GYNECOLOGICAL EXAMINATION WITHOUT ABNORMAL FINDING: Primary | ICD-10-CM

## 2020-10-09 PROCEDURE — 99396 PREV VISIT EST AGE 40-64: CPT | Performed by: OBSTETRICS & GYNECOLOGY

## 2020-10-09 PROCEDURE — 3074F SYST BP LT 130 MM HG: CPT | Performed by: INTERNAL MEDICINE

## 2020-10-09 PROCEDURE — 3078F DIAST BP <80 MM HG: CPT | Performed by: OBSTETRICS & GYNECOLOGY

## 2020-10-09 PROCEDURE — 3008F BODY MASS INDEX DOCD: CPT | Performed by: INTERNAL MEDICINE

## 2020-10-09 PROCEDURE — 3074F SYST BP LT 130 MM HG: CPT | Performed by: OBSTETRICS & GYNECOLOGY

## 2020-10-09 PROCEDURE — 99396 PREV VISIT EST AGE 40-64: CPT | Performed by: INTERNAL MEDICINE

## 2020-10-09 PROCEDURE — 3078F DIAST BP <80 MM HG: CPT | Performed by: INTERNAL MEDICINE

## 2020-10-09 NOTE — PATIENT INSTRUCTIONS
Prevention Guidelines, Women Ages 48 to 59  Screening tests and vaccines are an important part of managing your health. A screening test is done to find possible disorders or diseases in people who don't have any symptoms.  The goal is to find a disease e Colorectal cancer All women at average risk in this age group Multiple tests are available and are used at different times.  Possible tests include:  · Flexible sigmoidoscopy every 5 years, or  · Colonoscopy every 10 years, or  · CT colonography (virtual should be given at least 4 weeks after the first dose   Hepatitis A Women at increased risk for infection – talk with your healthcare provider 2 doses given at least 6 months apart   Hepatitis B Women at increased risk for infection – talk with your health with your healthcare provider At routine exams   Use of daily aspirin Women ages 54 and up in this age group who are at risk for cardiovascular health problems such as stroke When your risk is known   Use of tobacco and the health effects it can cause All
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
## 2020-10-27 ENCOUNTER — LAB ENCOUNTER (OUTPATIENT)
Dept: LAB | Facility: HOSPITAL | Age: 60
End: 2020-10-27
Attending: INTERNAL MEDICINE
Payer: COMMERCIAL

## 2020-10-27 DIAGNOSIS — Z00.00 PHYSICAL EXAM: ICD-10-CM

## 2020-10-27 PROCEDURE — 85025 COMPLETE CBC W/AUTO DIFF WBC: CPT

## 2020-10-27 PROCEDURE — 36415 COLL VENOUS BLD VENIPUNCTURE: CPT

## 2020-10-27 PROCEDURE — 80061 LIPID PANEL: CPT

## 2020-10-27 PROCEDURE — 84443 ASSAY THYROID STIM HORMONE: CPT

## 2020-10-27 PROCEDURE — 80053 COMPREHEN METABOLIC PANEL: CPT

## 2020-11-14 ENCOUNTER — HOSPITAL ENCOUNTER (OUTPATIENT)
Dept: MAMMOGRAPHY | Age: 60
Discharge: HOME | End: 2020-11-14
Attending: OBSTETRICS & GYNECOLOGY
Payer: COMMERCIAL

## 2020-11-14 DIAGNOSIS — Z12.31 VISIT FOR SCREENING MAMMOGRAM: ICD-10-CM

## 2020-11-14 PROCEDURE — 77067 SCR MAMMO BI INCL CAD: CPT | Performed by: OBSTETRICS & GYNECOLOGY

## 2020-11-14 PROCEDURE — 77063 BREAST TOMOSYNTHESIS BI: CPT | Performed by: OBSTETRICS & GYNECOLOGY

## 2021-03-21 ENCOUNTER — PATIENT MESSAGE (OUTPATIENT)
Dept: INTERNAL MEDICINE CLINIC | Facility: CLINIC | Age: 61
End: 2021-03-21

## 2021-03-22 NOTE — TELEPHONE ENCOUNTER
From: Radha Boone  To: Greg Morris MD  Sent: 3/21/2021 10:11 AM CDT  Subject: Non-Urgent Medical Question    How do I sign up for the covid vaccine through [facility] network?

## 2021-03-31 ENCOUNTER — IMMUNIZATION (OUTPATIENT)
Dept: LAB | Facility: HOSPITAL | Age: 61
End: 2021-03-31
Attending: HOSPITALIST
Payer: COMMERCIAL

## 2021-03-31 DIAGNOSIS — Z23 NEED FOR VACCINATION: Primary | ICD-10-CM

## 2021-03-31 PROCEDURE — 0011A SARSCOV2 VAC 100MCG/0.5ML IM: CPT | Performed by: PHYSICIAN ASSISTANT

## 2021-04-30 ENCOUNTER — IMMUNIZATION (OUTPATIENT)
Dept: LAB | Facility: HOSPITAL | Age: 61
End: 2021-04-30
Attending: PHYSICIAN ASSISTANT
Payer: COMMERCIAL

## 2021-04-30 DIAGNOSIS — Z23 NEED FOR VACCINATION: Primary | ICD-10-CM

## 2021-04-30 PROCEDURE — 0012A SARSCOV2 VAC 100MCG/0.5ML IM: CPT

## 2021-12-03 ENCOUNTER — OFFICE VISIT (OUTPATIENT)
Dept: OBGYN CLINIC | Facility: CLINIC | Age: 61
End: 2021-12-03
Payer: COMMERCIAL

## 2021-12-03 VITALS
BODY MASS INDEX: 34 KG/M2 | DIASTOLIC BLOOD PRESSURE: 83 MMHG | HEART RATE: 63 BPM | SYSTOLIC BLOOD PRESSURE: 131 MMHG | WEIGHT: 198 LBS

## 2021-12-03 DIAGNOSIS — Z01.419 ENCOUNTER FOR GYNECOLOGICAL EXAMINATION WITHOUT ABNORMAL FINDING: Primary | ICD-10-CM

## 2021-12-03 DIAGNOSIS — Z12.31 VISIT FOR SCREENING MAMMOGRAM: ICD-10-CM

## 2021-12-03 PROCEDURE — 3075F SYST BP GE 130 - 139MM HG: CPT | Performed by: OBSTETRICS & GYNECOLOGY

## 2021-12-03 PROCEDURE — 3079F DIAST BP 80-89 MM HG: CPT | Performed by: OBSTETRICS & GYNECOLOGY

## 2021-12-03 PROCEDURE — 99396 PREV VISIT EST AGE 40-64: CPT | Performed by: OBSTETRICS & GYNECOLOGY

## 2021-12-03 NOTE — PROGRESS NOTES
Lauren Sloan is a 63year old female [id number] No LMP recorded. Patient is postmenopausal. Patient presents with:  Gyn Exam: Annual, mammogram order  .     OBSTETRICS HISTORY:  OB History    Para Term  AB Living   7 7 7     7   SAB IAB Ectop frequent diarrhea or constipation, nausea / vomiting  Genitourinary:    denies dysuria, bothersome incontinence  Skin/Breast:   denies any breast pain, lumps, or discharge  Neurological:    denies frequent severe headaches  Psychiatric:   denies depression

## 2021-12-05 ENCOUNTER — HOSPITAL ENCOUNTER (OUTPATIENT)
Dept: MAMMOGRAPHY | Facility: HOSPITAL | Age: 61
Discharge: HOME | End: 2021-12-05
Attending: OBSTETRICS & GYNECOLOGY
Payer: COMMERCIAL

## 2021-12-05 DIAGNOSIS — Z12.31 VISIT FOR SCREENING MAMMOGRAM: ICD-10-CM

## 2021-12-05 PROCEDURE — 77063 BREAST TOMOSYNTHESIS BI: CPT | Performed by: OBSTETRICS & GYNECOLOGY

## 2021-12-05 PROCEDURE — 77067 SCR MAMMO BI INCL CAD: CPT | Performed by: OBSTETRICS & GYNECOLOGY

## 2021-12-11 ENCOUNTER — OFFICE VISIT (OUTPATIENT)
Dept: INTERNAL MEDICINE CLINIC | Facility: CLINIC | Age: 61
End: 2021-12-11
Payer: COMMERCIAL

## 2021-12-11 ENCOUNTER — LAB ENCOUNTER (OUTPATIENT)
Dept: LAB | Facility: HOSPITAL | Age: 61
End: 2021-12-11
Attending: INTERNAL MEDICINE
Payer: COMMERCIAL

## 2021-12-11 VITALS
SYSTOLIC BLOOD PRESSURE: 111 MMHG | HEART RATE: 77 BPM | WEIGHT: 200.38 LBS | BODY MASS INDEX: 34.21 KG/M2 | DIASTOLIC BLOOD PRESSURE: 64 MMHG | HEIGHT: 64 IN

## 2021-12-11 DIAGNOSIS — Z12.11 COLON CANCER SCREENING: ICD-10-CM

## 2021-12-11 DIAGNOSIS — Z00.00 PHYSICAL EXAM, ANNUAL: ICD-10-CM

## 2021-12-11 DIAGNOSIS — Z01.419 ENCOUNTER FOR ROUTINE GYNECOLOGICAL EXAMINATION WITH PAPANICOLAOU SMEAR OF CERVIX: ICD-10-CM

## 2021-12-11 DIAGNOSIS — Z00.00 PHYSICAL EXAM, ANNUAL: Primary | ICD-10-CM

## 2021-12-11 PROCEDURE — 3078F DIAST BP <80 MM HG: CPT | Performed by: INTERNAL MEDICINE

## 2021-12-11 PROCEDURE — 3008F BODY MASS INDEX DOCD: CPT | Performed by: INTERNAL MEDICINE

## 2021-12-11 PROCEDURE — 80053 COMPREHEN METABOLIC PANEL: CPT

## 2021-12-11 PROCEDURE — 99396 PREV VISIT EST AGE 40-64: CPT | Performed by: INTERNAL MEDICINE

## 2021-12-11 PROCEDURE — 84443 ASSAY THYROID STIM HORMONE: CPT

## 2021-12-11 PROCEDURE — 36415 COLL VENOUS BLD VENIPUNCTURE: CPT

## 2021-12-11 PROCEDURE — 85027 COMPLETE CBC AUTOMATED: CPT

## 2021-12-11 PROCEDURE — 3074F SYST BP LT 130 MM HG: CPT | Performed by: INTERNAL MEDICINE

## 2021-12-11 PROCEDURE — 80061 LIPID PANEL: CPT
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
## 2022-04-06 ENCOUNTER — TELEPHONE (OUTPATIENT)
Dept: INTERNAL MEDICINE CLINIC | Facility: CLINIC | Age: 62
End: 2022-04-06

## 2022-04-06 NOTE — TELEPHONE ENCOUNTER
RN called patient. Patient's date of birth and full name both confirmed. Last exposure was today at work. RN discussed isolation and quarantine guidelines per current CDC guidelines - discussing which is Day 0, day 5, day 10; also discussing specific criteria that needs to be met to come out of isolation if tested positive. Advised to wait 5 days to get tested. Denies sore throat. Denies fever, headache, dizziness, vomiting, loss of appetite, loss of taste/smell, diarrhea, cough, sore throat, runny nose, weakness, fatigue. RN advised virtual visit if these symptoms develop. RN advised ER if chest pain, difficulty breathing, dizziness, lightheadedness, severe headache. RN placed COVID PCR test per current protocol. RN advised she should contact central scheduling or use [employer] today, to schedule soonest test. Informed of turnaround time for PCR. She verbalizes understanding and is agreeable to instructions.
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
## 2022-04-11 ENCOUNTER — LAB ENCOUNTER (OUTPATIENT)
Dept: LAB | Age: 62
End: 2022-04-11
Attending: INTERNAL MEDICINE
Payer: COMMERCIAL

## 2022-04-11 DIAGNOSIS — Z20.822 ENCOUNTER FOR LABORATORY TESTING FOR COVID-19 VIRUS: ICD-10-CM

## 2022-04-12 LAB — SARS-COV-2 RNA RESP QL NAA+PROBE: NOT DETECTED

## 2022-04-13 ENCOUNTER — TELEMEDICINE (OUTPATIENT)
Dept: INTERNAL MEDICINE CLINIC | Facility: CLINIC | Age: 62
End: 2022-04-13

## 2022-04-13 DIAGNOSIS — R09.89 CHEST CONGESTION: Primary | ICD-10-CM

## 2022-04-13 PROCEDURE — 99213 OFFICE O/P EST LOW 20 MIN: CPT | Performed by: INTERNAL MEDICINE

## 2022-10-11 ENCOUNTER — LAB ENCOUNTER (OUTPATIENT)
Dept: LAB | Age: 62
End: 2022-10-11
Attending: NURSE PRACTITIONER
Payer: COMMERCIAL

## 2022-10-11 ENCOUNTER — TELEPHONE (OUTPATIENT)
Dept: INTERNAL MEDICINE CLINIC | Facility: CLINIC | Age: 62
End: 2022-10-11

## 2022-10-11 ENCOUNTER — OFFICE VISIT (OUTPATIENT)
Dept: INTERNAL MEDICINE CLINIC | Facility: CLINIC | Age: 62
End: 2022-10-11
Payer: COMMERCIAL

## 2022-10-11 VITALS
HEIGHT: 64 IN | DIASTOLIC BLOOD PRESSURE: 80 MMHG | BODY MASS INDEX: 32.95 KG/M2 | WEIGHT: 193 LBS | HEART RATE: 72 BPM | SYSTOLIC BLOOD PRESSURE: 123 MMHG | RESPIRATION RATE: 16 BRPM

## 2022-10-11 DIAGNOSIS — Z00.00 ANNUAL PHYSICAL EXAM: ICD-10-CM

## 2022-10-11 DIAGNOSIS — R42 DIZZINESS: ICD-10-CM

## 2022-10-11 DIAGNOSIS — R42 DIZZINESS: Primary | ICD-10-CM

## 2022-10-11 LAB
ALBUMIN SERPL-MCNC: 3.7 G/DL (ref 3.4–5)
ALBUMIN/GLOB SERPL: 0.9 {RATIO} (ref 1–2)
ALP LIVER SERPL-CCNC: 60 U/L
ALT SERPL-CCNC: 21 U/L
ANION GAP SERPL CALC-SCNC: 5 MMOL/L (ref 0–18)
AST SERPL-CCNC: 23 U/L (ref 15–37)
BASOPHILS # BLD AUTO: 0.04 X10(3) UL (ref 0–0.2)
BASOPHILS NFR BLD AUTO: 0.8 %
BILIRUB SERPL-MCNC: 0.7 MG/DL (ref 0.1–2)
BUN BLD-MCNC: 12 MG/DL (ref 7–18)
BUN/CREAT SERPL: 12.8 (ref 10–20)
CALCIUM BLD-MCNC: 8.9 MG/DL (ref 8.5–10.1)
CHLORIDE SERPL-SCNC: 108 MMOL/L (ref 98–112)
CO2 SERPL-SCNC: 29 MMOL/L (ref 21–32)
CREAT BLD-MCNC: 0.94 MG/DL
DEPRECATED RDW RBC AUTO: 41.3 FL (ref 35.1–46.3)
EOSINOPHIL # BLD AUTO: 0.04 X10(3) UL (ref 0–0.7)
EOSINOPHIL NFR BLD AUTO: 0.8 %
ERYTHROCYTE [DISTWIDTH] IN BLOOD BY AUTOMATED COUNT: 16.5 % (ref 11–15)
FASTING STATUS PATIENT QL REPORTED: NO
GFR SERPLBLD BASED ON 1.73 SQ M-ARVRAT: 69 ML/MIN/1.73M2 (ref 60–?)
GLOBULIN PLAS-MCNC: 4 G/DL (ref 2.8–4.4)
GLUCOSE BLD-MCNC: 89 MG/DL (ref 70–99)
HCT VFR BLD AUTO: 39.5 %
HGB BLD-MCNC: 11.8 G/DL
IMM GRANULOCYTES # BLD AUTO: 0.01 X10(3) UL (ref 0–1)
IMM GRANULOCYTES NFR BLD: 0.2 %
LYMPHOCYTES # BLD AUTO: 1.74 X10(3) UL (ref 1–4)
LYMPHOCYTES NFR BLD AUTO: 36.2 %
MCH RBC QN AUTO: 21.4 PG (ref 26–34)
MCHC RBC AUTO-ENTMCNC: 29.9 G/DL (ref 31–37)
MCV RBC AUTO: 71.6 FL
MONOCYTES # BLD AUTO: 0.37 X10(3) UL (ref 0.1–1)
MONOCYTES NFR BLD AUTO: 7.7 %
NEUTROPHILS # BLD AUTO: 2.61 X10 (3) UL (ref 1.5–7.7)
NEUTROPHILS # BLD AUTO: 2.61 X10(3) UL (ref 1.5–7.7)
NEUTROPHILS NFR BLD AUTO: 54.3 %
OSMOLALITY SERPL CALC.SUM OF ELEC: 293 MOSM/KG (ref 275–295)
PLATELET # BLD AUTO: 288 10(3)UL (ref 150–450)
POTASSIUM SERPL-SCNC: 4 MMOL/L (ref 3.5–5.1)
PROT SERPL-MCNC: 7.7 G/DL (ref 6.4–8.2)
RBC # BLD AUTO: 5.52 X10(6)UL
SODIUM SERPL-SCNC: 142 MMOL/L (ref 136–145)
TSI SER-ACNC: 0.9 MIU/ML (ref 0.36–3.74)
VIT B12 SERPL-MCNC: 476 PG/ML (ref 193–986)
WBC # BLD AUTO: 4.8 X10(3) UL (ref 4–11)

## 2022-10-11 PROCEDURE — 84443 ASSAY THYROID STIM HORMONE: CPT

## 2022-10-11 PROCEDURE — 93005 ELECTROCARDIOGRAM TRACING: CPT

## 2022-10-11 PROCEDURE — 3074F SYST BP LT 130 MM HG: CPT | Performed by: NURSE PRACTITIONER

## 2022-10-11 PROCEDURE — 3079F DIAST BP 80-89 MM HG: CPT | Performed by: NURSE PRACTITIONER

## 2022-10-11 PROCEDURE — 80053 COMPREHEN METABOLIC PANEL: CPT

## 2022-10-11 PROCEDURE — 85060 BLOOD SMEAR INTERPRETATION: CPT

## 2022-10-11 PROCEDURE — 36415 COLL VENOUS BLD VENIPUNCTURE: CPT

## 2022-10-11 PROCEDURE — 82607 VITAMIN B-12: CPT

## 2022-10-11 PROCEDURE — 85025 COMPLETE CBC W/AUTO DIFF WBC: CPT

## 2022-10-11 PROCEDURE — 99213 OFFICE O/P EST LOW 20 MIN: CPT | Performed by: NURSE PRACTITIONER

## 2022-10-11 PROCEDURE — 3008F BODY MASS INDEX DOCD: CPT | Performed by: NURSE PRACTITIONER

## 2022-10-11 PROCEDURE — 93010 ELECTROCARDIOGRAM REPORT: CPT | Performed by: NURSE PRACTITIONER

## 2022-10-11 NOTE — TELEPHONE ENCOUNTER
appt changed from 12:40 to earlier appt d/t conflicting with another appt- c/o dizziness on/off x 2 mths, no dizziness at the present

## 2022-12-10 ENCOUNTER — OFFICE VISIT (OUTPATIENT)
Dept: OBGYN CLINIC | Facility: CLINIC | Age: 62
End: 2022-12-10
Payer: COMMERCIAL

## 2022-12-10 VITALS
SYSTOLIC BLOOD PRESSURE: 116 MMHG | BODY MASS INDEX: 34.37 KG/M2 | HEART RATE: 72 BPM | HEIGHT: 63 IN | WEIGHT: 194 LBS | DIASTOLIC BLOOD PRESSURE: 74 MMHG

## 2022-12-10 DIAGNOSIS — Z12.31 VISIT FOR SCREENING MAMMOGRAM: ICD-10-CM

## 2022-12-10 DIAGNOSIS — Z01.419 WOMEN'S ANNUAL ROUTINE GYNECOLOGICAL EXAMINATION: Primary | ICD-10-CM

## 2022-12-10 PROCEDURE — 3074F SYST BP LT 130 MM HG: CPT | Performed by: OBSTETRICS & GYNECOLOGY

## 2022-12-10 PROCEDURE — 99396 PREV VISIT EST AGE 40-64: CPT | Performed by: OBSTETRICS & GYNECOLOGY

## 2022-12-10 PROCEDURE — 3008F BODY MASS INDEX DOCD: CPT | Performed by: OBSTETRICS & GYNECOLOGY

## 2022-12-10 PROCEDURE — 3078F DIAST BP <80 MM HG: CPT | Performed by: OBSTETRICS & GYNECOLOGY

## 2022-12-12 LAB — HPV I/H RISK 1 DNA SPEC QL NAA+PROBE: NEGATIVE

## 2022-12-13 ENCOUNTER — LAB ENCOUNTER (OUTPATIENT)
Dept: LAB | Age: 62
End: 2022-12-13
Attending: INTERNAL MEDICINE
Payer: COMMERCIAL

## 2022-12-13 ENCOUNTER — OFFICE VISIT (OUTPATIENT)
Dept: INTERNAL MEDICINE CLINIC | Facility: CLINIC | Age: 62
End: 2022-12-13
Payer: COMMERCIAL

## 2022-12-13 VITALS
RESPIRATION RATE: 18 BRPM | DIASTOLIC BLOOD PRESSURE: 78 MMHG | WEIGHT: 192.19 LBS | SYSTOLIC BLOOD PRESSURE: 112 MMHG | HEIGHT: 63 IN | HEART RATE: 70 BPM | BODY MASS INDEX: 34.05 KG/M2

## 2022-12-13 DIAGNOSIS — Z00.00 PHYSICAL EXAM, ANNUAL: ICD-10-CM

## 2022-12-13 DIAGNOSIS — Z12.11 COLON CANCER SCREENING: ICD-10-CM

## 2022-12-13 DIAGNOSIS — Z00.00 PHYSICAL EXAM, ANNUAL: Primary | ICD-10-CM

## 2022-12-13 LAB
CHOLEST SERPL-MCNC: 140 MG/DL (ref ?–200)
FASTING PATIENT LIPID ANSWER: YES
HDLC SERPL-MCNC: 71 MG/DL (ref 40–59)
LDLC SERPL CALC-MCNC: 57 MG/DL (ref ?–100)
NONHDLC SERPL-MCNC: 69 MG/DL (ref ?–130)
TRIGL SERPL-MCNC: 57 MG/DL (ref 30–149)
VLDLC SERPL CALC-MCNC: 8 MG/DL (ref 0–30)

## 2022-12-13 PROCEDURE — 99396 PREV VISIT EST AGE 40-64: CPT | Performed by: INTERNAL MEDICINE

## 2022-12-13 PROCEDURE — 3074F SYST BP LT 130 MM HG: CPT | Performed by: INTERNAL MEDICINE

## 2022-12-13 PROCEDURE — 80061 LIPID PANEL: CPT

## 2022-12-13 PROCEDURE — 36415 COLL VENOUS BLD VENIPUNCTURE: CPT

## 2022-12-13 PROCEDURE — 3008F BODY MASS INDEX DOCD: CPT | Performed by: INTERNAL MEDICINE

## 2022-12-13 PROCEDURE — 3078F DIAST BP <80 MM HG: CPT | Performed by: INTERNAL MEDICINE

## 2022-12-19 LAB — LAST PAP RESULT: NORMAL

## 2023-01-16 ENCOUNTER — HOSPITAL ENCOUNTER (OUTPATIENT)
Dept: MAMMOGRAPHY | Age: 63
Discharge: HOME | End: 2023-01-16
Attending: OBSTETRICS & GYNECOLOGY
Payer: COMMERCIAL

## 2023-01-16 DIAGNOSIS — Z12.31 VISIT FOR SCREENING MAMMOGRAM: ICD-10-CM

## 2023-01-16 PROCEDURE — 77067 SCR MAMMO BI INCL CAD: CPT | Performed by: OBSTETRICS & GYNECOLOGY

## 2023-01-16 PROCEDURE — 77063 BREAST TOMOSYNTHESIS BI: CPT | Performed by: OBSTETRICS & GYNECOLOGY

## 2023-03-13 ENCOUNTER — NURSE ONLY (OUTPATIENT)
Facility: CLINIC | Age: 63
End: 2023-03-13

## 2023-03-13 DIAGNOSIS — Z12.11 SCREEN FOR COLON CANCER: Primary | ICD-10-CM

## 2023-03-14 NOTE — PROGRESS NOTES
Dx: average risk crc screening  Colonoscopy with IV or MAC sedation  Split dose golytely preparation, sent to pharmacy  Please review prep instructions with patient    - NO herbal supplements or weight loss medications x 7 days prior to the procedure(s)

## 2023-03-17 ENCOUNTER — TELEPHONE (OUTPATIENT)
Dept: INTERNAL MEDICINE CLINIC | Facility: CLINIC | Age: 63
End: 2023-03-17

## 2023-03-17 NOTE — TELEPHONE ENCOUNTER
Patient is calling to ask if PCP will be able to complete the FMLA forms requested by   Dr. Nichole Winters. Spine Surgeon    [address] phone #[phone]    Patient will be uploading information via [employer] for review and completion. Please advise.

## 2023-03-17 NOTE — TELEPHONE ENCOUNTER
Do we have the notes from the spinal surgeon? Will with spinal surg not do the FMLA ?    She will need f/u apt for documentation of the forms

## 2023-03-18 NOTE — TELEPHONE ENCOUNTER
Patient has an appt scheduled for 4/18/2023 @ 1:20pm. She was wondering if she can be seen sooner as she needs these forms completed ASAP. I also put her on the wait list so that she can be notified if there are any cancellations.

## 2023-05-16 ENCOUNTER — HOSPITAL ENCOUNTER (OUTPATIENT)
Facility: HOSPITAL | Age: 63
Setting detail: HOSPITAL OUTPATIENT SURGERY
Discharge: HOME | End: 2023-05-16
Attending: INTERNAL MEDICINE | Admitting: INTERNAL MEDICINE
Payer: COMMERCIAL

## 2023-05-16 VITALS
SYSTOLIC BLOOD PRESSURE: 113 MMHG | DIASTOLIC BLOOD PRESSURE: 66 MMHG | RESPIRATION RATE: 20 BRPM | WEIGHT: 193 LBS | BODY MASS INDEX: 33.77 KG/M2 | HEIGHT: 63.5 IN | TEMPERATURE: 98 F | HEART RATE: 63 BPM | OXYGEN SATURATION: 99 %

## 2023-05-16 DIAGNOSIS — Z12.11 SCREEN FOR COLON CANCER: ICD-10-CM

## 2023-05-16 PROCEDURE — 0DBL8ZX EXCISION OF TRANSVERSE COLON, VIA NATURAL OR ARTIFICIAL OPENING ENDOSCOPIC, DIAGNOSTIC: ICD-10-PCS | Performed by: INTERNAL MEDICINE

## 2023-05-16 PROCEDURE — 45385 COLONOSCOPY W/LESION REMOVAL: CPT | Performed by: INTERNAL MEDICINE

## 2023-05-16 PROCEDURE — G0500 MOD SEDAT ENDO SERVICE >5YRS: HCPCS | Performed by: INTERNAL MEDICINE

## 2023-05-16 RX ORDER — MIDAZOLAM HYDROCHLORIDE 1 MG/ML
INJECTION INTRAMUSCULAR; INTRAVENOUS
Status: DISCONTINUED | OUTPATIENT
Start: 2023-05-16 | End: 2023-05-16

## 2023-05-16 RX ORDER — SODIUM CHLORIDE, SODIUM LACTATE, POTASSIUM CHLORIDE, CALCIUM CHLORIDE 600; 310; 30; 20 MG/100ML; MG/100ML; MG/100ML; MG/100ML
INJECTION, SOLUTION INTRAVENOUS CONTINUOUS
Status: DISCONTINUED | OUTPATIENT
Start: 2023-05-16 | End: 2023-05-16

## 2023-05-16 NOTE — DISCHARGE INSTRUCTIONS

## 2023-05-24 ENCOUNTER — TELEPHONE (OUTPATIENT)
Facility: CLINIC | Age: 63
End: 2023-05-24

## 2023-05-25 NOTE — TELEPHONE ENCOUNTER
----- Message from Alisson Ojeda MD sent at 5/24/2023  5:27 PM CDT -----  Colonoscopy recall 5 years      Dear Brooke De Leon,    I reviewed the pathology report from the polyp(s) we completely removed during your recent colonoscopy. The polyp removed was a right sided hyperplastic polyp, which is a benign growth. However, these are the types of polyps that if not removed could become a colon cancer. All of your polyps were completely removed. The current health care guidelines recommend that patients with the size, number, and types of polyps you have should repeat their colonoscopy in 5 years to look for any new polyps that might have grown. If you have further questions please call me at [phone] or message me through [address]     Sincerely,   Camacho Hampton MD

## 2023-05-25 NOTE — TELEPHONE ENCOUNTER
Recall colon in 5 years per Dr Denise Strauss.  Colon done 05/26/2023    Health maintenance updated and message sent to pt outreach to repeat colonoscopy in 5 years

## 2023-12-14 ENCOUNTER — OFFICE VISIT (OUTPATIENT)
Dept: INTERNAL MEDICINE CLINIC | Facility: CLINIC | Age: 63
End: 2023-12-14
Payer: COMMERCIAL

## 2023-12-14 ENCOUNTER — OFFICE VISIT (OUTPATIENT)
Dept: OBGYN CLINIC | Facility: CLINIC | Age: 63
End: 2023-12-14
Payer: COMMERCIAL

## 2023-12-14 VITALS
WEIGHT: 193.19 LBS | SYSTOLIC BLOOD PRESSURE: 122 MMHG | RESPIRATION RATE: 18 BRPM | DIASTOLIC BLOOD PRESSURE: 75 MMHG | HEART RATE: 68 BPM | HEIGHT: 63.5 IN | BODY MASS INDEX: 33.81 KG/M2

## 2023-12-14 VITALS
WEIGHT: 192.38 LBS | HEART RATE: 99 BPM | DIASTOLIC BLOOD PRESSURE: 72 MMHG | BODY MASS INDEX: 34 KG/M2 | SYSTOLIC BLOOD PRESSURE: 113 MMHG

## 2023-12-14 DIAGNOSIS — Z01.419 WELL WOMAN EXAM WITH ROUTINE GYNECOLOGICAL EXAM: Primary | ICD-10-CM

## 2023-12-14 DIAGNOSIS — Z00.00 PHYSICAL EXAM, ANNUAL: Primary | ICD-10-CM

## 2023-12-14 DIAGNOSIS — H61.23 BILATERAL IMPACTED CERUMEN: ICD-10-CM

## 2023-12-14 DIAGNOSIS — Z12.31 SCREENING MAMMOGRAM, ENCOUNTER FOR: ICD-10-CM

## 2023-12-14 PROCEDURE — 3008F BODY MASS INDEX DOCD: CPT | Performed by: INTERNAL MEDICINE

## 2023-12-14 PROCEDURE — 3074F SYST BP LT 130 MM HG: CPT | Performed by: INTERNAL MEDICINE

## 2023-12-14 PROCEDURE — 99396 PREV VISIT EST AGE 40-64: CPT | Performed by: INTERNAL MEDICINE

## 2023-12-14 PROCEDURE — 3074F SYST BP LT 130 MM HG: CPT | Performed by: OBSTETRICS & GYNECOLOGY

## 2023-12-14 PROCEDURE — 3078F DIAST BP <80 MM HG: CPT | Performed by: OBSTETRICS & GYNECOLOGY

## 2023-12-14 PROCEDURE — 3078F DIAST BP <80 MM HG: CPT | Performed by: INTERNAL MEDICINE

## 2023-12-14 PROCEDURE — 99396 PREV VISIT EST AGE 40-64: CPT | Performed by: OBSTETRICS & GYNECOLOGY

## 2023-12-23 ENCOUNTER — LAB ENCOUNTER (OUTPATIENT)
Dept: LAB | Facility: HOSPITAL | Age: 63
End: 2023-12-23
Attending: INTERNAL MEDICINE
Payer: COMMERCIAL

## 2023-12-23 DIAGNOSIS — Z00.00 PHYSICAL EXAM, ANNUAL: ICD-10-CM

## 2023-12-23 LAB
ALBUMIN SERPL-MCNC: 4.4 G/DL (ref 3.2–4.8)
ALBUMIN/GLOB SERPL: 1.5 {RATIO} (ref 1–2)
ALP LIVER SERPL-CCNC: 56 U/L
ALT SERPL-CCNC: 13 U/L
ANION GAP SERPL CALC-SCNC: 7 MMOL/L (ref 0–18)
AST SERPL-CCNC: 28 U/L (ref ?–34)
BILIRUB SERPL-MCNC: 0.9 MG/DL (ref 0.2–1.1)
BUN BLD-MCNC: 11 MG/DL (ref 9–23)
BUN/CREAT SERPL: 12.1 (ref 10–20)
CALCIUM BLD-MCNC: 9.3 MG/DL (ref 8.7–10.4)
CHLORIDE SERPL-SCNC: 108 MMOL/L (ref 98–112)
CHOLEST SERPL-MCNC: 158 MG/DL (ref ?–200)
CO2 SERPL-SCNC: 27 MMOL/L (ref 21–32)
CREAT BLD-MCNC: 0.91 MG/DL
DEPRECATED RDW RBC AUTO: 40.3 FL (ref 35.1–46.3)
EGFRCR SERPLBLD CKD-EPI 2021: 72 ML/MIN/1.73M2 (ref 60–?)
ERYTHROCYTE [DISTWIDTH] IN BLOOD BY AUTOMATED COUNT: 16 % (ref 11–15)
FASTING PATIENT LIPID ANSWER: YES
FASTING STATUS PATIENT QL REPORTED: YES
GLOBULIN PLAS-MCNC: 2.9 G/DL (ref 2.8–4.4)
GLUCOSE BLD-MCNC: 92 MG/DL (ref 70–99)
HCT VFR BLD AUTO: 37.6 %
HDLC SERPL-MCNC: 70 MG/DL (ref 40–59)
HGB BLD-MCNC: 11.7 G/DL
LDLC SERPL CALC-MCNC: 76 MG/DL (ref ?–100)
MCH RBC QN AUTO: 22.1 PG (ref 26–34)
MCHC RBC AUTO-ENTMCNC: 31.1 G/DL (ref 31–37)
MCV RBC AUTO: 70.9 FL
NONHDLC SERPL-MCNC: 88 MG/DL (ref ?–130)
OSMOLALITY SERPL CALC.SUM OF ELEC: 293 MOSM/KG (ref 275–295)
PLATELET # BLD AUTO: 260 10(3)UL (ref 150–450)
POTASSIUM SERPL-SCNC: 4 MMOL/L (ref 3.5–5.1)
PROT SERPL-MCNC: 7.3 G/DL (ref 5.7–8.2)
RBC # BLD AUTO: 5.3 X10(6)UL
SODIUM SERPL-SCNC: 142 MMOL/L (ref 136–145)
TRIGL SERPL-MCNC: 56 MG/DL (ref 30–149)
TSI SER-ACNC: 1.36 MIU/ML (ref 0.55–4.78)
VLDLC SERPL CALC-MCNC: 9 MG/DL (ref 0–30)
WBC # BLD AUTO: 4.8 X10(3) UL (ref 4–11)

## 2023-12-23 PROCEDURE — 36415 COLL VENOUS BLD VENIPUNCTURE: CPT

## 2023-12-23 PROCEDURE — 80061 LIPID PANEL: CPT

## 2023-12-23 PROCEDURE — 80053 COMPREHEN METABOLIC PANEL: CPT

## 2023-12-23 PROCEDURE — 84443 ASSAY THYROID STIM HORMONE: CPT

## 2023-12-23 PROCEDURE — 85027 COMPLETE CBC AUTOMATED: CPT

## 2024-01-26 ENCOUNTER — TELEPHONE (OUTPATIENT)
Dept: INTERNAL MEDICINE CLINIC | Facility: CLINIC | Age: 64
End: 2024-01-26

## 2024-01-26 ENCOUNTER — OFFICE VISIT (OUTPATIENT)
Dept: OTOLARYNGOLOGY | Facility: CLINIC | Age: 64
End: 2024-01-26
Payer: COMMERCIAL

## 2024-01-26 DIAGNOSIS — H61.23 BILATERAL IMPACTED CERUMEN: Primary | ICD-10-CM

## 2024-01-26 PROCEDURE — 99202 OFFICE O/P NEW SF 15 MIN: CPT | Performed by: OTOLARYNGOLOGY

## 2024-01-26 NOTE — PROGRESS NOTES
Tasha Hoang is a 63 year old female.    Chief Complaint   Patient presents with    Ear Wax     Bilateral ear cleaning       HISTORY OF PRESENT ILLNESS    Patient presents for cerumen removal. No other complaints or concerns at this time    Social History     Socioeconomic History    Marital status:    Tobacco Use    Smoking status: Never    Smokeless tobacco: Never   Vaping Use    Vaping Use: Never used   Substance and Sexual Activity    Alcohol use: No    Drug use: No    Sexual activity: Yes   Other Topics Concern    Caffeine Concern No    Exercise Yes     Comment: Daily stretching/walking       Family History   Problem Relation Age of Onset    Hypertension Father     Stroke Brother         CVA    Sickle Cell Brother     Heart Disease Other         CAD, family h/o, F, M, S x 2       History reviewed. No pertinent past medical history.    Past Surgical History:   Procedure Laterality Date    COLONOSCOPY N/A 2023    Procedure: COLONOSCOPY;  Surgeon: Nayeli De La Paz MD;  Location: [facility] ENDOSCOPY    [facility]      x7    UPPER ARM/ELBOW SURGERY UNLISTED Left 1994    arm fracture repair       REVIEW OF SYSTEMS    System Neg/Pos Details   Constitutional Negative Fatigue, fever and weight loss.   ENMT Negative Drooling.   Eyes Negative Blurred vision and vision changes.   Respiratory Negative Dyspnea and wheezing.   Cardio Negative Chest pain, irregular heartbeat/palpitations and syncope.   GI Negative Abdominal pain and diarrhea.   Endocrine Negative Cold intolerance and heat intolerance.   Neuro Negative Tremors.   Psych Negative Anxiety and depression.   Integumentary Negative Frequent skin infections, pigment change and rash.   Hema/Lymph Negative Easy bleeding and easy bruising.           PHYSICAL EXAM    There were no vitals taken for this visit.       Constitutional Normal Overall appearance - Normal.        Neck Exam Normal Inspection - Normal. Palpation - Normal. Parotid gland - Normal. Thyroid gland -  Normal.             Head/Face Normal Facial features - Normal. Eyebrows - Normal. Skull - Normal.             Ears Normal Inspection - Right: Normal, Left: Normal. Canal - Right: Normal, Left: Normal. TM - Right: Normal, Left: Normal.   Skin Normal Inspection - Normal.                              Canals:  Right: Canal reveals cerumen impaction,   Left: Canal reveals cerumen impaction,     Tympanic Membranes:  Right: Normal tympanic membrane.   Left: Normal tympanic membrane.     TM Visualized Method:   Right TM examined via otomicroscopy.    Left TM examined via otomicroscopy.      PROCEDURE:    Removal of cerumen impaction   The cerumen impaction was completely removed using microscopy.   Removal was completed by using acurette and/or suction.   Comments: Return to clinic as needed.  Avoid q-tips, water precautions and use over the counter wax remedies as needed.    No current outpatient medications on file.  ASSESSMENT AND PLAN    1. Bilateral impacted cerumen        All cerumen was removed using microscopy. I have asked the patient to return to see me as needed for repeat cerumen removal in the future.      Espinoza Lujan MD    1/26/2024    4:39 PM

## 2024-02-14 NOTE — TELEPHONE ENCOUNTER
Dr. Garcia,     *The ACKNOWLEDGE button has been moved to the top right ribbon*    Please sign off on form if you agree to: Fmla recert for spinal stenosis   (place your signature on the first page only)    -From your Inbasket, Highlight the patient and click Chart   -Double click the DATE Forms Completion telephone encounter  -Scroll down to the Media section   -Click the blue Hyperlink: DESC    -Click Acknowledge located in the top right ribbon/menu   -Drag the mouse into the blank space of the document and a + sign will appear. Left click to   electronically sign the document.     Thank you,    Elena ÁLVAREZ

## 2024-02-19 ENCOUNTER — HOSPITAL ENCOUNTER (OUTPATIENT)
Dept: MAMMOGRAPHY | Facility: HOSPITAL | Age: 64
Discharge: HOME | End: 2024-02-19
Attending: INTERNAL MEDICINE
Payer: COMMERCIAL

## 2024-02-19 DIAGNOSIS — Z12.31 SCREENING MAMMOGRAM, ENCOUNTER FOR: ICD-10-CM

## 2024-02-19 PROCEDURE — 77067 SCR MAMMO BI INCL CAD: CPT | Performed by: INTERNAL MEDICINE

## 2024-02-19 PROCEDURE — 77063 BREAST TOMOSYNTHESIS BI: CPT | Performed by: INTERNAL MEDICINE

## 2024-07-10 ENCOUNTER — HOSPITAL ENCOUNTER (OUTPATIENT)
Age: 64
Discharge: HOME | End: 2024-07-10
Attending: EMERGENCY MEDICINE
Payer: COMMERCIAL

## 2024-07-10 ENCOUNTER — APPOINTMENT (OUTPATIENT)
Dept: GENERAL RADIOLOGY | Age: 64
End: 2024-07-10
Attending: EMERGENCY MEDICINE
Payer: COMMERCIAL

## 2024-07-10 VITALS
OXYGEN SATURATION: 100 % | RESPIRATION RATE: 16 BRPM | DIASTOLIC BLOOD PRESSURE: 88 MMHG | HEART RATE: 76 BPM | SYSTOLIC BLOOD PRESSURE: 127 MMHG | TEMPERATURE: 98 F

## 2024-07-10 DIAGNOSIS — S82.842A BIMALLEOLAR FRACTURE OF LEFT ANKLE, CLOSED, INITIAL ENCOUNTER: Primary | ICD-10-CM

## 2024-07-10 PROCEDURE — 73630 X-RAY EXAM OF FOOT: CPT | Performed by: EMERGENCY MEDICINE

## 2024-07-10 PROCEDURE — 29515 APPLICATION SHORT LEG SPLINT: CPT

## 2024-07-10 PROCEDURE — 73610 X-RAY EXAM OF ANKLE: CPT | Performed by: EMERGENCY MEDICINE

## 2024-07-10 PROCEDURE — 99214 OFFICE O/P EST MOD 30 MIN: CPT

## 2024-07-10 PROCEDURE — 73590 X-RAY EXAM OF LOWER LEG: CPT | Performed by: EMERGENCY MEDICINE

## 2024-07-10 PROCEDURE — 99204 OFFICE O/P NEW MOD 45 MIN: CPT

## 2024-07-10 RX ORDER — IBUPROFEN 600 MG/1
600 TABLET ORAL ONCE
Status: COMPLETED | OUTPATIENT
Start: 2024-07-10 | End: 2024-07-10

## 2024-07-10 NOTE — ED PROVIDER NOTES
Patient Seen in: [facility]      History     Chief Complaint   Patient presents with    Ankle Injury     Stated Complaint: fell and hurt left ankle    Subjective:   HPI    Patient is a 63-year-old female with past history of chronic back pain who presents now with left lower leg injury.  The patient states she slipped down a few stairs earlier today, injuring her left lower leg.  Patient states pain is most pronounced at the left ankle though she does have some pain in the left foot and proximal lower leg just below the knee.  Patient denies any head injury.  The patient denies any chest, abdominal, back pain.  Patient did scrape her right knee but states that she has no bony tenderness of the right knee and normal range of motion.    Objective:   History reviewed. No pertinent past medical history.           Past Surgical History:   Procedure Laterality Date    Colonoscopy N/A 2023    Procedure: COLONOSCOPY;  Surgeon: Nayeli De La Paz MD;  Location: [facility] ENDOSCOPY          x7    Upper arm/elbow surgery unlisted Left 1994    arm fracture repair                Social History     Socioeconomic History    Marital status:    Tobacco Use    Smoking status: Never    Smokeless tobacco: Never   Vaping Use    Vaping status: Never Used   Substance and Sexual Activity    Alcohol use: No    Drug use: No    Sexual activity: Yes   Other Topics Concern    Caffeine Concern No    Exercise Yes     Comment: Daily stretching/walking   Social History Narrative    The patient does not use an assistive device..      The patient does live in a home with stairs.              Review of Systems    Positive for stated Chief Complaint: Ankle Injury    Other systems are as noted in HPI.  Constitutional and vital signs reviewed.      All other systems reviewed and negative except as noted above.    Physical Exam     ED Triage Vitals [07/10/24 1128]   /88   Pulse 76   Resp 16   Temp 98.2 °F (36.8 °C)   Temp src  Temporal   SpO2 100 %   O2 Device None (Room air)       Current Vitals:   Vital Signs  BP: 127/88  Pulse: 76  Resp: 16  Temp: 98.2 °F (36.8 °C)  Temp src: Temporal    Oxygen Therapy  SpO2: 100 %  O2 Device: None (Room air)            Physical Exam    Constitutional: Well-developed well-nourished in no acute distress  Head: Normocephalic, no swelling or tenderness  Neck: No focal bony tenderness  Eyes: Nonicteric sclera, no conjunctival injection  ENT: TMs are clear and flat bilaterally.  There is no posterior pharyngeal erythema  Chest: Clear to auscultation, no tenderness  Cardiovascular: Regular rate and rhythm without murmur  Abdomen: Soft, nontender and nondistended  Neurologic: Patient is awake, alert and oriented ×3.  The patient's motor strength is 5 out of 5 and symmetric in the upper and lower extremities bilaterally  Extremities: Moderate diffuse swelling and tenderness at the left ankle.  There is minimal left proximal fibular tenderness.  There is minimal tenderness of the dorsal aspect of the left foot.  There is normal range of motion and no significant tenderness of the right knee  Skin: There is a small superficial abrasion to the right anterior knee      ED Course   Labs Reviewed - No data to display     Procedure note: Splint check  After placement of a short leg OCL by the medical assistant, the proper joint is immobilized, the left foot is neurovascularly intact.     The patient's x-ray results were independently reviewed by myself.  There is a nondisplaced oblique fracture of the left distal fibula.  There is also a nondisplaced fracture of the posterior tibia.      Chest x-ray results were discussed with the patient.    Patient's clinical presentation and x-ray results were discussed with Mary from Dr. Lo's office.  She recommends that the patient come directly to his office after discharge here to be evaluated.    This plan was discussed with the patient and her family and they are  agreeable.         MDM      Left lower leg fracture versus ankle fracture                                   Medical Decision Making      Disposition and Plan     Clinical Impression:  1. Bimalleolar fracture of left ankle, closed, initial encounter         Disposition:  Discharge  7/10/2024 12:27 pm    Follow-up:  Darian Lo MD  [address] [facility]  [address]  [facility] [address]  [phone]                Medications Prescribed:  There are no discharge medications for this patient.

## 2024-07-10 NOTE — ED INITIAL ASSESSMENT (HPI)
Pt presents with left ankle pain/swelling s/p mechanical fall down 2-3 stairs. I \"rolled my ankle\" per pt. Pt reports injury occurred at 1030a today. Pt also struck right knee, skin abrasion present.

## 2024-07-15 ENCOUNTER — TELEMEDICINE (OUTPATIENT)
Dept: INTERNAL MEDICINE CLINIC | Facility: CLINIC | Age: 64
End: 2024-07-15
Payer: COMMERCIAL

## 2024-07-15 ENCOUNTER — PATIENT MESSAGE (OUTPATIENT)
Dept: INTERNAL MEDICINE CLINIC | Facility: CLINIC | Age: 64
End: 2024-07-15

## 2024-07-15 DIAGNOSIS — S82.65XS CLOSED NONDISPLACED FRACTURE OF LATERAL MALLEOLUS OF LEFT FIBULA, SEQUELA: ICD-10-CM

## 2024-07-15 DIAGNOSIS — M79.671 RIGHT FOOT PAIN: ICD-10-CM
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
## 2024-07-15 DIAGNOSIS — M47.816 SPONDYLOSIS OF LUMBAR REGION WITHOUT MYELOPATHY OR RADICULOPATHY: ICD-10-CM
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
## 2024-07-15 NOTE — TELEPHONE ENCOUNTER
Patient was seen in the urgent care on 7/10/24 for ankle injury.    She is requesting further recommendations and orders for additional tests since the injury was due to a fall- wants to confirm no further injury elsewhere.    Urgent care follow up appointment scheduled (scheduled as virtual visit as patient states that it difficult for her to come into the office with injury at this time):    Future Appointments   Date Time Provider Department Center   7/15/2024  3:00 PM Regan Garcia MD [facility]

## 2024-09-03 ENCOUNTER — HOSPITAL ENCOUNTER (OUTPATIENT)
Dept: GENERAL RADIOLOGY | Facility: HOSPITAL | Age: 64
Discharge: HOME | End: 2024-09-03
Attending: INTERNAL MEDICINE
Payer: COMMERCIAL

## 2024-09-03 DIAGNOSIS — M47.816 SPONDYLOSIS OF LUMBAR REGION WITHOUT MYELOPATHY OR RADICULOPATHY: ICD-10-CM

## 2024-09-03 DIAGNOSIS — M79.671 RIGHT FOOT PAIN: Primary | ICD-10-CM

## 2024-09-03 DIAGNOSIS — M79.671 RIGHT FOOT PAIN: ICD-10-CM

## 2024-09-03 PROCEDURE — 72110 X-RAY EXAM L-2 SPINE 4/>VWS: CPT | Performed by: INTERNAL MEDICINE

## 2024-09-03 PROCEDURE — 73630 X-RAY EXAM OF FOOT: CPT | Performed by: INTERNAL MEDICINE

## 2024-10-01 ENCOUNTER — TELEMEDICINE (OUTPATIENT)
Dept: INTERNAL MEDICINE CLINIC | Facility: CLINIC | Age: 64
End: 2024-10-01
Payer: COMMERCIAL

## 2024-10-01 DIAGNOSIS — S99.912D INJURY OF LEFT ANKLE, SUBSEQUENT ENCOUNTER: ICD-10-CM

## 2024-10-01 DIAGNOSIS — Z12.31 SCREENING MAMMOGRAM, ENCOUNTER FOR: ICD-10-CM

## 2024-10-01 DIAGNOSIS — M54.41 CHRONIC MIDLINE LOW BACK PAIN WITH RIGHT-SIDED SCIATICA: Primary | ICD-10-CM

## 2024-10-01 DIAGNOSIS — G89.29 CHRONIC MIDLINE LOW BACK PAIN WITH RIGHT-SIDED SCIATICA: Primary | ICD-10-CM

## 2024-10-01 PROCEDURE — 99214 OFFICE O/P EST MOD 30 MIN: CPT | Performed by: INTERNAL MEDICINE

## 2024-10-01 NOTE — PROGRESS NOTES
Patient ID: Tasha Hoang is a 64 year old female.  No chief complaint on file.         HISTORY OF PRESENT ILLNESS:   Patient presents for above.  This visit is conducted using Telemedicine with live, interactive video and audio.  C/c follow up   C/o broke her ankle 7/10/2024 , was going down the basement steps and missed a step and went down 4 or 5 stairs   She had a video call with dr wisdom   Had been seeing ortho dr strickland   Right ankle also hurt and also has back and knee pain - worsening of chronic issues   She was on leave for her back for 6 weeks - signed by chiropractor but had to stop bc she broke her ankle and needs a renewal for her back by nov   Getting  physical therapy at home because she is in the boot and she finished 6 weeks of nonweightbearing   Seeing dr strickland monthly   Needs forms filled for the back --back is hurting more bc she is walking more   Can't do therapy on both   Nov she is due back at work in nov   May to July was a leave to rest for her back but then she broke her ankle in July so the leave was disrupted       Review of Systems   MEDICAL HISTORY:   No past medical history on file.    Past Surgical History:   Procedure Laterality Date    Colonoscopy N/A 2023    Procedure: COLONOSCOPY;  Surgeon: Nayeli De La Paz MD;  Location: [facility] ENDOSCOPY    [facility]      x7    Upper arm/elbow surgery unlisted Left 1994    arm fracture repair       No current outpatient medications on file.    Allergies:  Allergies   Allergen Reactions    Sulfa Antibiotics Coughing       Social History     Socioeconomic History    Marital status:      Spouse name: Not on file    Number of children: Not on file    Years of education: Not on file    Highest education level: Not on file   Occupational History    Not on file   Tobacco Use    Smoking status: Never    Smokeless tobacco: Never   Vaping Use    Vaping status: Never Used   Substance and Sexual Activity    Alcohol use: No    Drug use: No     Sexual activity: Yes   Other Topics Concern     Service Not Asked    Blood Transfusions Not Asked    Caffeine Concern No    Occupational Exposure Not Asked    Hobby Hazards Not Asked    Sleep Concern Not Asked    Stress Concern Not Asked    Weight Concern Not Asked    Special Diet Not Asked    Back Care Not Asked    Exercise Yes     Comment: Daily stretching/walking    Bike Helmet Not Asked    Seat Belt Not Asked    Self-Exams Not Asked   Social History Narrative    The patient does not use an assistive device..      The patient does live in a home with stairs.     Social Determinants of Health     Financial Resource Strain: Not on file   Food Insecurity: Not on file   Transportation Needs: Not on file   Physical Activity: Not on file   Stress: Not on file   Social Connections: Not on file   Housing Stability: Not on file       PHYSICAL EXAM:   Unable to perform vitals or do physical exam as this is a virtual video visit.  Patient appears alert.  No conversational dyspnea or distress.    ASSESSMENT/PLAN:   1. Chronic midline low back pain with right-sided sciatica    2. Injury of left ankle, subsequent encounter    3. Screening mammogram, encounter for  - [facility] BREE 2D+3D SCREENING BILAT (CPT=77067/53658); Future        Had a very long discussion with pt , she will fax me the chiropractor because her therapy was disrupted due to her ankle fracture  She is aware to call back to f/u if she doesn't hear back   Its been 3 mns since her fall and she's still in a boot   We will review notes and then send message to patient and forms department for her to get the forms filled  Mammogram ordered so that she can get it scheduled for April  Follow-up with Ortho    No follow-ups on file.    Time spent on encounter  18 minutes   Video time 18 minutes   Documentation time 18 minutes     Tasha Hoang understands video evaluation is not a substitute for face-to-face examination or emergency care. Patient advised to go to  ER or call 911 for worsening symptoms or acute distress.     [facility]  Telehealth Verbal Consent   I conducted a telehealth visit with Tasha Hoang today, 10/01/24, which was completed using two-way, real-time interactive audio and video communication. This has been done in good yessica to provide continuity of care in the best interest of the provider-patient relationship, due to the COVID -19 public health crisis/national emergency where restrictions of face-to-face office visits are ongoing. Every conscious effort was taken to allow for sufficient and adequate time to complete the visit.  The patient was made aware of the limitations of the telehealth visit, including treatment limitations as no physical exam could be performed.  The patient was advised to call 911 or to go to the ER in case there was an emergency.  The patient was also advised of the potential privacy & security concerns related to the telehealth platform.   The patient was made aware of where to find [facility]'s notice of privacy practices, telehealth consent form and other related consent forms and documents.  which are located on the [facility] website. The patient verbally agreed to telehealth consent form, related consents and the risks discussed.    Lastly, the patient confirmed that they were in [place].   Included in this visit, time may have been spent reviewing labs, medications, radiology tests and decision making. Appropriate medical decision-making and tests are ordered as detailed in the plan of care above.  Coding/billing information is submitted for this visit based on complexity of care and/or time spent for the visit.    This note was prepared using Dragon Medical voice recognition dictation software. As a result errors may occur. When identified these errors have been corrected. While every attempt is made to correct errors during dictation discrepancies may still exist.    Zoraida Garcia MD  10/1/2024

## 2024-10-03 ENCOUNTER — TELEPHONE (OUTPATIENT)
Dept: INTERNAL MEDICINE CLINIC | Facility: CLINIC | Age: 64
End: 2024-10-03

## 2024-10-03 NOTE — TELEPHONE ENCOUNTER
Ma provided forms from patients visit that need to be sent to the FORMS DEPT.  Additional information from [facility] - Nonoccupational Disability Medical Report.    Sending to the Forms Dept via email and the paper copy to be sent internally.

## 2024-10-15 ENCOUNTER — TELEPHONE (OUTPATIENT)
Dept: INTERNAL MEDICINE CLINIC | Facility: CLINIC | Age: 64
End: 2024-10-15

## 2024-10-15 NOTE — TELEPHONE ENCOUNTER
Hi Dr Garcia,    Patient is requesting an extension for short term disability for Spinal Stenosis. Patient states she had an ankle injury in July and was unable to complete physical therapy. Patient is requesting extension to start 11/12/24 through 05/12/25.    Do you support?    Thank you  Kvng

## 2024-10-15 NOTE — TELEPHONE ENCOUNTER
Patient called back states call dropped. Patient wanted to make sure all information was taken. Informed patient of information taken and patient verbalized understanding. Patient requesting forms to be faxed. Informed patient I do not see an Release of Information on file I can send a [employer] message with Release of Information. Patient agreed. Informed patient if Release of Information not received we will upload forms to [employer]. Patient verbalized understanding.

## 2024-10-15 NOTE — TELEPHONE ENCOUNTER
Patient called back to provide details for disability form.     Type of Leave: Disability (short term disability) continuous  Reason for Leave: Spinal stenosis   Start date of leave: 11/12/24  End date of leave: 05/12/25  How many flare ups per month/length?:  How many appts per month/length?:   Was Fee and Turnaround info Given?: Yes

## 2024-10-17 NOTE — TELEPHONE ENCOUNTER
Dr. Garcia    **The ACKNOWLEDGE button has been moved to the top right ribbon**    Please sign off on form if you agree to: disability until 5/12/25  (place your signature on the first page only)  -From your Inbasket, Highlight the patient and click Chart  -Double click the 10/15/24 Forms Completion telephone encounter  -Scroll down to the Media section  -Click the blue Hyperlink: Malina Garcia 10/17/24    -Click Acknowledge located in the top right ribbon/menu  -Drag the mouse into the blank space of the document and a + sign will appear. Left click to  electronically sign the document.    Thank you,    Flor

## 2024-11-04 NOTE — TELEPHONE ENCOUNTER
Patient called back for detail, But stated she needs to resend I new set of forms and to place Disability form On hold.   Type of Leave: disability  Reason for Leave:  Start date of leave: 11/10/2024  End date of leave: 05/12/2025  How many flare ups per month/length?:  How many appts per month/length?:   Was Fee and Turnaround info Given?:

## 2025-01-09 ENCOUNTER — TELEPHONE (OUTPATIENT)
Dept: INTERNAL MEDICINE CLINIC | Facility: CLINIC | Age: 65
End: 2025-01-09

## 2025-01-09 ENCOUNTER — OFFICE VISIT (OUTPATIENT)
Dept: INTERNAL MEDICINE CLINIC | Facility: CLINIC | Age: 65
End: 2025-01-09
Payer: COMMERCIAL

## 2025-01-09 ENCOUNTER — OFFICE VISIT (OUTPATIENT)
Dept: PODIATRY CLINIC | Facility: CLINIC | Age: 65
End: 2025-01-09
Payer: COMMERCIAL

## 2025-01-09 ENCOUNTER — LAB ENCOUNTER (OUTPATIENT)
Dept: LAB | Facility: HOSPITAL | Age: 65
End: 2025-01-09
Attending: INTERNAL MEDICINE
Payer: COMMERCIAL

## 2025-01-09 VITALS
BODY MASS INDEX: 35.52 KG/M2 | DIASTOLIC BLOOD PRESSURE: 79 MMHG | HEART RATE: 73 BPM | HEIGHT: 63.5 IN | SYSTOLIC BLOOD PRESSURE: 123 MMHG | WEIGHT: 203 LBS

## 2025-01-09 DIAGNOSIS — E55.9 VITAMIN D DEFICIENCY: ICD-10-CM

## 2025-01-09 DIAGNOSIS — L60.3 NAIL DYSTROPHY: Primary | ICD-10-CM

## 2025-01-09 DIAGNOSIS — Z00.00 PHYSICAL EXAM, ANNUAL: Primary | ICD-10-CM

## 2025-01-09 DIAGNOSIS — Z00.00 PHYSICAL EXAM, ANNUAL: ICD-10-CM

## 2025-01-09 LAB
ALBUMIN SERPL-MCNC: 4.3 G/DL (ref 3.2–4.8)
ALBUMIN/GLOB SERPL: 1.5 {RATIO} (ref 1–2)
ALP LIVER SERPL-CCNC: 65 U/L
ALT SERPL-CCNC: 16 U/L
ANION GAP SERPL CALC-SCNC: 6 MMOL/L (ref 0–18)
AST SERPL-CCNC: 27 U/L (ref ?–34)
BILIRUB SERPL-MCNC: 0.9 MG/DL (ref 0.2–1.1)
BUN BLD-MCNC: 14 MG/DL (ref 9–23)
BUN/CREAT SERPL: 16.7 (ref 10–20)
CALCIUM BLD-MCNC: 9.4 MG/DL (ref 8.7–10.4)
CHLORIDE SERPL-SCNC: 108 MMOL/L (ref 98–112)
CHOLEST SERPL-MCNC: 158 MG/DL (ref ?–200)
CO2 SERPL-SCNC: 29 MMOL/L (ref 21–32)
CREAT BLD-MCNC: 0.84 MG/DL
DEPRECATED RDW RBC AUTO: 42.1 FL (ref 35.1–46.3)
EGFRCR SERPLBLD CKD-EPI 2021: 79 ML/MIN/1.73M2 (ref 60–?)
ERYTHROCYTE [DISTWIDTH] IN BLOOD BY AUTOMATED COUNT: 16.7 % (ref 11–15)
FASTING PATIENT LIPID ANSWER: YES
FASTING STATUS PATIENT QL REPORTED: YES
GLOBULIN PLAS-MCNC: 2.9 G/DL (ref 2–3.5)
GLUCOSE BLD-MCNC: 82 MG/DL (ref 70–99)
HCT VFR BLD AUTO: 37.7 %
HDLC SERPL-MCNC: 69 MG/DL (ref 40–59)
HGB BLD-MCNC: 11.4 G/DL
LDLC SERPL CALC-MCNC: 79 MG/DL (ref ?–100)
MCH RBC QN AUTO: 21.6 PG (ref 26–34)
MCHC RBC AUTO-ENTMCNC: 30.2 G/DL (ref 31–37)
MCV RBC AUTO: 71.3 FL
NONHDLC SERPL-MCNC: 89 MG/DL (ref ?–130)
OSMOLALITY SERPL CALC.SUM OF ELEC: 296 MOSM/KG (ref 275–295)
PLATELET # BLD AUTO: 249 10(3)UL (ref 150–450)
POTASSIUM SERPL-SCNC: 3.9 MMOL/L (ref 3.5–5.1)
PROT SERPL-MCNC: 7.2 G/DL (ref 5.7–8.2)
RBC # BLD AUTO: 5.29 X10(6)UL
SODIUM SERPL-SCNC: 143 MMOL/L (ref 136–145)
TRIGL SERPL-MCNC: 48 MG/DL (ref 30–149)
TSI SER-ACNC: 0.91 UIU/ML (ref 0.55–4.78)
VIT D+METAB SERPL-MCNC: 26.4 NG/ML (ref 30–100)
VLDLC SERPL CALC-MCNC: 7 MG/DL (ref 0–30)
WBC # BLD AUTO: 5.3 X10(3) UL (ref 4–11)

## 2025-01-09 PROCEDURE — 84443 ASSAY THYROID STIM HORMONE: CPT

## 2025-01-09 PROCEDURE — 80061 LIPID PANEL: CPT

## 2025-01-09 PROCEDURE — 36415 COLL VENOUS BLD VENIPUNCTURE: CPT

## 2025-01-09 PROCEDURE — 99396 PREV VISIT EST AGE 40-64: CPT | Performed by: INTERNAL MEDICINE

## 2025-01-09 PROCEDURE — 99204 OFFICE O/P NEW MOD 45 MIN: CPT | Performed by: STUDENT IN AN ORGANIZED HEALTH CARE EDUCATION/TRAINING PROGRAM

## 2025-01-09 PROCEDURE — 82306 VITAMIN D 25 HYDROXY: CPT

## 2025-01-09 PROCEDURE — 85027 COMPLETE CBC AUTOMATED: CPT

## 2025-01-09 PROCEDURE — 80053 COMPREHEN METABOLIC PANEL: CPT

## 2025-01-09 NOTE — TELEPHONE ENCOUNTER
STEPHEN PARRA [id number] ADDITIONAL FORMS RECEIVED FROM PT FOR DISABILITY  FROM [facility] EMAILED TO FORMS DEPT AND [facility] MAILED
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
## 2025-01-09 NOTE — PROGRESS NOTES
[facility]  Progress Note      Tasha Hoang is a 64 year old female.   Chief Complaint   Patient presents with    Toenail Fungus     Consult right foot great toenail fungus intermittent pain 5/10 when wearing close shoes. Has XR in Epic of both feet. Hx of left ankle fracture.             HPI:     Patient is a pleasant 64-year-old female who presents to clinic for bilateral foot evaluation.  Patient admits to sustaining a left ankle fracture which  she has been going to physical therapy.  Left ankle fractures treated by Dr. Lo. Pt presents today with chief complain of discolored right hallux toenail.  Denies any pedal injuries or trauma.  Denies any signs of infection.    Allergies: Sulfa antibiotics    No current outpatient medications on file.      History reviewed. No pertinent past medical history.   Past Surgical History:   Procedure Laterality Date    Colonoscopy N/A 2023    Procedure: COLONOSCOPY;  Surgeon: Nayeli De La Paz MD;  Location: [facility] ENDOSCOPY          x7    Upper arm/elbow surgery unlisted Left 1994    arm fracture repair      Family History   Problem Relation Age of Onset    Hypertension Father     Stroke Brother         CVA    Sickle Cell Brother     Heart Disease Other         CAD, family h/o, F, M, S x 2      Social History     Socioeconomic History    Marital status:    Tobacco Use    Smoking status: Never    Smokeless tobacco: Never   Vaping Use    Vaping status: Never Used   Substance and Sexual Activity    Alcohol use: No    Drug use: No    Sexual activity: Yes   Other Topics Concern    Caffeine Concern No    Exercise Yes     Comment: Daily stretching/walking           REVIEW OF SYSTEMS:     Denies nause, fever, chills  No calf pain  Denies chest pain or SOB      EXAM:   There were no vitals taken for this visit.  GENERAL: well developed, well nourished, in no apparent distress  EXTREMITIES:   1. Integument: Normal skin temperature and turgor. Right  hallux toenail is thickened and dystrophic with subungal debri.   2. Vascular: Dorsalis pedis two out of four bilateral and posterior tibial pulses two out of   four bilateral, capillary refill normal.   3. Musculoskeletal: All muscle groups are graded 5 out of 5 in the foot and ankle.   4. Neurological: Normal sharp dull sensation; reflexes normal.             ASSESSMENT AND PLAN:   Diagnoses and all orders for this visit:    Nail dystrophy  -     Specimen to Pathology, Tissue; Future        Plan:     Reviewed treatment options for nail dystrophy / onychomycosis including:   No treatment and monitoring, topical meds, oral meds, nail removal and laser treatment.  Advantages and disadvantages of each reviewed. Using oral agents would require regular   blood test monitoring due to risk of hepatotoxicity and other adverse reactions including drug interactions.   Topical meds are much safer yet carry very low efficacy.  Nail specimen obtained and sent to pathology for PAS stain  Hepatic labs reviewed and within normal limits  Will call patient to review treatment options again once pathology specimen results are obtained.       The patient indicates understanding of these issues and agrees to the plan.        Alejandra Hernandez DPM

## 2025-01-28 NOTE — TELEPHONE ENCOUNTER
Dr. Garcia,    Please sign off on form if you agree to: disability due to back pain. Patient to Return to work 05/12/25.    -Signature page will be the first page scanned  -From your Inbasket, Highlight the patient and click Chart   -Double click the 01/09/2025 Forms Completion telephone encounter  -Scroll down to the Media section   -Click the blue Hyperlink: disability Dr Garcia 01/28/25  -Click Acknowledge located in the top right ribbon/menu   -Drag the mouse into the blank space of the document and a + sign will appear. Left click to   electronically sign the document.  -Once signed, simply exit out of the screen and you signature will be saved.     Thank you,    Mini

## 2025-01-28 NOTE — TELEPHONE ENCOUNTER
Called patient to obtain details for disability. Patient states forms are for her back. Patient states all info should be the same from forms completed in OCT/2024. Return to work 05/12/25. Patient also requesting note from Sammie she has with forms be included with forms.

## 2025-01-30 NOTE — TELEPHONE ENCOUNTER
Disability E-faxed to [facility] [phone], awaiting confirmation.

## 2025-02-11 ENCOUNTER — OFFICE VISIT (OUTPATIENT)
Dept: OBGYN CLINIC | Facility: CLINIC | Age: 65
End: 2025-02-11
Payer: COMMERCIAL

## 2025-02-11 VITALS
BODY MASS INDEX: 36 KG/M2 | HEART RATE: 68 BPM | WEIGHT: 203.81 LBS | SYSTOLIC BLOOD PRESSURE: 120 MMHG | DIASTOLIC BLOOD PRESSURE: 77 MMHG

## 2025-02-11 DIAGNOSIS — Z87.81 HISTORY OF ANKLE FRACTURE: ICD-10-CM

## 2025-02-11 DIAGNOSIS — Z01.419 ENCOUNTER FOR GYNECOLOGICAL EXAMINATION WITHOUT ABNORMAL FINDING: Primary | ICD-10-CM

## 2025-02-11 PROCEDURE — 99396 PREV VISIT EST AGE 40-64: CPT | Performed by: OBSTETRICS & GYNECOLOGY

## 2025-02-16 NOTE — PROGRESS NOTES
Tasha Hoang is a 64 year old female  No LMP recorded. Patient is postmenopausal.   Chief Complaint   Patient presents with    Gyn Exam     ANNUAL EXAM Reviewed Preventative/Wellness form with patient. Had ankle fracture from misstepping.    .  She has no complaints.  Denies postmenopausal bleeding, urinary or sexual issues.  History of Present Illness        OBSTETRICS HISTORY:     OB History    Para Term  AB Living   7 7 7 0 0 7   SAB IAB Ectopic Multiple Live Births   0 0 0 0 7      # Outcome Date GA Lbr Curtis/2nd Weight Sex Type Anes PTL Lv   7 Term 01   8 lb (3.629 kg) M NORMAL SPONT   OTTO   6 Term 98   9 lb (4.082 kg) M NORMAL SPONT   OTTO   5 Term 96    M NORMAL SPONT   OTTO   4 Term 94   6 lb (2.722 kg) F NORMAL SPONT   OTTO   3 Term 90   8 lb 3 oz (3.714 kg) M NORMAL SPONT   OTTO   2 Term 85   7 lb 11 oz (3.487 kg) F NORMAL SPONT   OTTO   1 Term 83   8 lb 3 oz (3.714 kg) M NORMAL SPONT   OTTO       GYNE HISTORY:     Periods none due to menopause        Latest Ref Rng & Units 12/10/2022    12:20 PM 2019    12:01 PM   RECENT PAP RESULTS   INTERPRETATION/RESULT: Negative for intraepithelial lesion or malignancy Negative for intraepithelial lesion or malignancy  Negative for intraepithelial lesion or malignancy    HPV Negative Negative  Negative          MEDICAL HISTORY:     No past medical history on file.  Past Surgical History:   Procedure Laterality Date    Colonoscopy N/A 2023    Procedure: COLONOSCOPY;  Surgeon: Nayeli De La Paz MD;  Location: [facility] ENDOSCOPY          x7    Upper arm/elbow surgery unlisted Left 1994    arm fracture repair     OB History    Para Term  AB Living   7 7 7 0 0 7   SAB IAB Ectopic Multiple Live Births   0 0 0 0 7        SOCIAL HISTORY:     Social History     Socioeconomic History    Marital status:      Spouse name: Not on file    Number of children: Not on file    Years of education: Not  on file    Highest education level: Not on file   Occupational History    Not on file   Tobacco Use    Smoking status: Never    Smokeless tobacco: Never   Vaping Use    Vaping status: Never Used   Substance and Sexual Activity    Alcohol use: No    Drug use: No    Sexual activity: Yes   Other Topics Concern     Service Not Asked    Blood Transfusions Not Asked    Caffeine Concern No    Occupational Exposure Not Asked    Hobby Hazards Not Asked    Sleep Concern Not Asked    Stress Concern Not Asked    Weight Concern Not Asked    Special Diet Not Asked    Back Care Not Asked    Exercise Yes     Comment: Daily stretching/walking    Bike Helmet Not Asked    Seat Belt Not Asked    Self-Exams Not Asked   Social History Narrative    The patient does not use an assistive device..      The patient does live in a home with stairs.     Social Drivers of Health     Food Insecurity: Not on file   Transportation Needs: Not on file   Stress: Not on file   Housing Stability: Not on file       FAMILY HISTORY:     Family History   Problem Relation Age of Onset    Hypertension Father     Stroke Brother         CVA    Sickle Cell Brother     Heart Disease Other         CAD, family h/o, F, M, S x 2       MEDICATIONS:     No current outpatient medications on file.    ALLERGIES:     Allergies[1]      REVIEW OF SYSTEMS:     Constitutional:    denies fever / chills  Eyes:     denies blurred or double vision  Cardiovascular:  denies chest pain or palpitations  Respiratory:    denies shortness of breath  Gastrointestinal:  denies severe abdominal pain, frequent diarrhea or constipation, nausea / vomiting  Genitourinary:    denies dysuria, bothersome incontinence  Skin/Breast:   denies any breast pain, lumps, or discharge  Neurological:    denies frequent severe headaches  Psychiatric:   denies depression or anxiety, thoughts of harming self or others  Heme/Lymph:    denies easy bruising or bleeding    PHYSICAL EXAM:   Blood pressure  120/77, pulse 68, weight 203 lb 12.8 oz (92.4 kg), not currently breastfeeding.  Physical Exam      Constitutional:  well developed, well nourished  Head/Face:  normocephalic  Neck/Thyroid:  thyroid symmetric, no thyromegaly, no nodules, no adenopathy  Lymphatic: no abnormal supraclavicular or axillary adenopathy is noted  Breast:   normal without palpable masses, tenderness, asymmetry, nipple discharge, nipple retraction or skin changes  Respiratory:   nonlabored breathing  Cardiovascular: regular rate and rhythm  Abdomen:   soft, nontender, nondistended, no masses  Skin/Hair: no unusual rashes or bruises  Extremities:  no edema, no cyanosis  Psychiatric:   Oriented to time, place, person and situation. Appropriate mood and affect    Pelvic Exam:  External Genitalia:  normal appearance, hair distribution, and no lesions  Urethral Meatus:   normal in size, location, without lesions and prolapse  Bladder:    no fullness, masses or tenderness  Vagina:    normal appearance without lesions, no abnormal discharge  Cervix:    normal without tenderness on motion  Uterus:    normal in size, contour, position, mobility, without tenderness  Adnexa:   normal without masses or tenderness  Perineum:   normal  Anus:    no hemorroids    Results      ASSESSMENT & PLAN:     Tasha was seen today for gyn exam.    Diagnoses and all orders for this visit:    Encounter for gynecological examination without abnormal finding    History of ankle fracture  -     XR DEXA BONE DENSITOMETRY (CPT=77080); Future        Assessment & Plan        SUMMARY:    Pap: Next cotest 12/25-27 per ASCCP guidelines.    Mammogram: scheduled    BCM: Postmenopausal    STD screening: declines    Colon cancer screening: UTD    Misc: Calcium needs reviewed (1500 mg diet + supplement). Weight bearing exercise encouraged. Call if any VB (if perimenopausal, reviewed abn VB patterns)    HM updated    Depression screen:   Depression Screening (PHQ-2/PHQ-9): Over the LAST  2 WEEKS   Little interest or pleasure in doing things (over the last two weeks)?: Not at all    Feeling down, depressed, or hopeless (over the last two weeks)?: Not at all    PHQ-2 SCORE: 0          FOLLOW-UP     Return in about 1 year (around 2/11/2026) for annual gyne exam.    Note to patient and family:  The 21st Century Cures Act makes medical notes available to patients in the interest of transparency.  However, please be advised that this is a medical document.  It is intended as a peer to peer communication.  It is written in medical language and may contain abbreviations or verbiage that are technical and unfamiliar.  It may appear blunt or direct.  Medical documents are intended to carry relevant information, facts as evident, and the clinical opinion of the practitioner.       [1]   Allergies  Allergen Reactions    Sulfa Antibiotics Coughing

## 2025-02-22 ENCOUNTER — HOSPITAL ENCOUNTER (OUTPATIENT)
Dept: MAMMOGRAPHY | Facility: HOSPITAL | Age: 65
Discharge: HOME | End: 2025-02-22
Attending: INTERNAL MEDICINE
Payer: COMMERCIAL

## 2025-02-22 DIAGNOSIS — Z12.31 SCREENING MAMMOGRAM, ENCOUNTER FOR: ICD-10-CM

## 2025-02-22 PROCEDURE — 77063 BREAST TOMOSYNTHESIS BI: CPT | Performed by: INTERNAL MEDICINE

## 2025-02-22 PROCEDURE — 77067 SCR MAMMO BI INCL CAD: CPT | Performed by: INTERNAL MEDICINE

## 2025-04-01 ENCOUNTER — MED REC SCAN ONLY (OUTPATIENT)
Dept: INTERNAL MEDICINE CLINIC | Facility: CLINIC | Age: 65
End: 2025-04-01

## 2025-04-08 ENCOUNTER — TELEMEDICINE (OUTPATIENT)
Dept: INTERNAL MEDICINE CLINIC | Facility: CLINIC | Age: 65
End: 2025-04-08
Payer: COMMERCIAL

## 2025-04-08 DIAGNOSIS — M54.41 CHRONIC MIDLINE LOW BACK PAIN WITH RIGHT-SIDED SCIATICA: Primary | ICD-10-CM

## 2025-04-08 DIAGNOSIS — N39.46 MIXED STRESS AND URGE URINARY INCONTINENCE: ICD-10-CM

## 2025-04-08 DIAGNOSIS — G89.29 CHRONIC MIDLINE LOW BACK PAIN WITH RIGHT-SIDED SCIATICA: Primary | ICD-10-CM

## 2025-04-08 PROCEDURE — 98006 SYNCH AUDIO-VIDEO EST MOD 30: CPT | Performed by: INTERNAL MEDICINE

## 2025-04-08 NOTE — PROGRESS NOTES
Patient ID: Tasha Hoang is a 64 year old female.  No chief complaint on file.         HISTORY OF PRESENT ILLNESS:   Patient presents for above.  This visit is conducted using Telemedicine with live, interactive video and audio.  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    MEDICAL HISTORY:   No past medical history on file.    Past Surgical History:   Procedure Laterality Date    Colonoscopy N/A 2023    Procedure: COLONOSCOPY;  Surgeon: Nayeli De La Paz MD;  Location: [facility] ENDOSCOPY          x7    Upper arm/elbow surgery unlisted Left 1994    arm fracture repair       No current outpatient medications on file.    Allergies:Allergies[1]    Social History     Socioeconomic History    Marital status:      Spouse name: Not on file    Number of children: Not on file    Years of education: Not on file    Highest education level: Not on file   Occupational History    Not on file   Tobacco Use    Smoking status: Never    Smokeless tobacco: Never   Vaping Use    Vaping status: Never Used   Substance and Sexual Activity    Alcohol use: No    Drug use: No    Sexual activity: Yes   Other Topics Concern     Service Not Asked    Blood Transfusions Not Asked    Caffeine Concern No    Occupational Exposure Not Asked    Hobby Hazards Not Asked    Sleep Concern Not Asked    Stress Concern Not Asked    Weight Concern Not Asked    Special Diet Not Asked    Back Care Not Asked    Exercise Yes      Comment: Daily stretching/walking    Bike Helmet Not Asked    Seat Belt Not Asked    Self-Exams Not Asked   Social History Narrative    The patient does not use an assistive device..      The patient does live in a home with stairs.     Social Drivers of Health     Food Insecurity: Not on file   Transportation Needs: Not on file   Stress: Not on file   Housing Stability: Not on file       PHYSICAL EXAM:   Unable to perform vitals or do physical exam as this is a virtual video visit.    Pt is alert and oriented x 3, no acute distress  Patient speaking complete sentences without any conversational dyspnea or respiratory distress  No coughing heard    ASSESSMENT/PLAN:   1. Chronic midline low back pain with right-sided sciatica  - Pain Management Referral - In [facility]  - Neurosurgery Referral - In [facility]  - Physical Therapy Referral - [facility]    2. Mixed stress and urge urinary incontinence  - Pain Management Referral - In [facility]  - Neurosurgery Referral - In [facility]  - Physical Therapy Referral - [facility]    Needs off from may to aug (has time off until may )   Will refer for PT   Needs letter RTW in aug with restrictions but we will reeval at that time   Needs to f/u the specialist     No follow-ups on file.    Time spent on encounter  33 minutes   Video time 33 minutes   Documentation time 33 minutes     Tasha Hoang understands video evaluation is not a substitute for face-to-face examination or emergency care. Patient advised to go to ER or call 911 for worsening symptoms or acute distress.     Telehealth outside of [facility]  Telehealth Verbal Consent   I conducted a telehealth visit with Tasha Hoang today, 04/08/25, which was completed using two-way, real-time interactive audio and video communication. This has been done in good yessica to provide continuity of care in the best interest of the provider-patient relationship, due to the COVID -19 public health crisis/national emergency  where restrictions of face-to-face office visits are ongoing. Every conscious effort was taken to allow for sufficient and adequate time to complete the visit.  The patient was made aware of the limitations of the telehealth visit, including treatment limitations as no physical exam could be performed.  The patient was advised to call 911 or to go to the ER in case there was an emergency.  The patient was also advised of the potential privacy & security concerns related to the telehealth platform.   The patient was made aware of where to find [facility]'s notice of privacy practices, telehealth consent form and other related consent forms and documents.  which are located on the [facility] website. The patient verbally agreed to telehealth consent form, related consents and the risks discussed.    Lastly, the patient confirmed that they were in [place].   Included in this visit, time may have been spent reviewing labs, medications, radiology tests and decision making. Appropriate medical decision-making and tests are ordered as detailed in the plan of care above.  Coding/billing information is submitted for this visit based on complexity of care and/or time spent for the visit.    This note was prepared using Dragon Medical voice recognition dictation software. As a result errors may occur. When identified these errors have been corrected. While every attempt is made to correct errors during dictation discrepancies may still exist.    Zoraida Garcia MD  4/8/2025       [1]   Allergies  Allergen Reactions    Sulfa Antibiotics Coughing

## 2025-04-16 ENCOUNTER — TELEPHONE (OUTPATIENT)
Dept: INTERNAL MEDICINE CLINIC | Facility: CLINIC | Age: 65
End: 2025-04-16

## 2025-04-16 NOTE — TELEPHONE ENCOUNTER
Called patient to clarify details for disability forms. Patient is currently off until 5/12/25. Provider notes just say needs time off from May to Aug. Need date in August. Also need Release of Information for HR, Ivana Valle as stated in pts email. [employer] message sent for Release of Information. Release of Information on file 1/28/25 for [employer]. Left voicemail to call back [phone].

## 2025-04-16 NOTE — TELEPHONE ENCOUNTER
Dr. Garcia,    Please sign off on form if you agree to: Disability extension from 5/12/25 to 8/31/25    -Signature page will be the first page scanned  -From your Inbasket, Highlight the patient and click Chart   -Double click the 4/16/25 Forms Completion telephone encounter  -Scroll down to the Media section   -Click the blue Hyperlink: Disability, Dr. Garcia, 4/16/25  -Click Acknowledge located in the top right ribbon/menu   -Drag the mouse into the blank space of the document and a + sign will appear. Left click to   electronically sign the document.  -Once signed, simply exit out of the screen and you signature will be saved.     Thank you,  Nayeli BEVERLY

## 2025-04-16 NOTE — TELEPHONE ENCOUNTER
Patient returned call, states requested extension date is 8/31/25.  Informed patient Release of Information sent via [employer] for CMS.  Patient acknowledged.

## 2025-05-20 ENCOUNTER — TELEPHONE (OUTPATIENT)
Dept: SURGERY | Facility: CLINIC | Age: 65
End: 2025-05-20

## 2025-05-20 ENCOUNTER — OFFICE VISIT (OUTPATIENT)
Dept: SURGERY | Facility: CLINIC | Age: 65
End: 2025-05-20
Payer: COMMERCIAL

## 2025-05-20 VITALS
OXYGEN SATURATION: 100 % | HEART RATE: 76 BPM | WEIGHT: 203 LBS | SYSTOLIC BLOOD PRESSURE: 125 MMHG | HEIGHT: 63.5 IN | BODY MASS INDEX: 35.52 KG/M2 | DIASTOLIC BLOOD PRESSURE: 73 MMHG

## 2025-05-20 DIAGNOSIS — M47.816 LUMBAR SPONDYLOSIS: ICD-10-CM

## 2025-05-20 DIAGNOSIS — M54.2 CERVICALGIA: ICD-10-CM

## 2025-05-20 DIAGNOSIS — M48.062 LUMBAR STENOSIS WITH NEUROGENIC CLAUDICATION: Primary | ICD-10-CM

## 2025-05-20 NOTE — TELEPHONE ENCOUNTER
Patient brought imaging to her appointment from High Definition MRI with Dr. Warner.     11/12/2024 - L spine routine    Films uploaded to PACS.  Disc returned to the patient.
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
## 2025-06-03 ENCOUNTER — HOSPITAL ENCOUNTER (OUTPATIENT)
Dept: BONE DENSITY | Facility: HOSPITAL | Age: 65
Discharge: HOME | End: 2025-06-03
Attending: OBSTETRICS & GYNECOLOGY
Payer: COMMERCIAL

## 2025-06-03 DIAGNOSIS — Z87.81 HISTORY OF ANKLE FRACTURE: ICD-10-CM

## 2025-06-03 PROCEDURE — 77080 DXA BONE DENSITY AXIAL: CPT | Performed by: OBSTETRICS & GYNECOLOGY

## 2025-06-17 ENCOUNTER — HOSPITAL ENCOUNTER (OUTPATIENT)
Dept: GENERAL RADIOLOGY | Facility: HOSPITAL | Age: 65
Discharge: HOME | End: 2025-06-17
Payer: COMMERCIAL

## 2025-06-17 DIAGNOSIS — M54.2 CERVICALGIA: ICD-10-CM

## 2025-06-17 PROCEDURE — 72052 X-RAY EXAM NECK SPINE 6/>VWS: CPT

## 2025-06-19 ENCOUNTER — TELEPHONE (OUTPATIENT)
Dept: OBGYN CLINIC | Facility: CLINIC | Age: 65
End: 2025-06-19

## 2025-06-19 NOTE — TELEPHONE ENCOUNTER
----- Message from Janet Cordon sent at 6/17/2025  8:45 PM CDT -----  Needs office visit while on call to review result using models -- 10 min slot on call  ----- Message -----  From: Dunia Desir In  Sent: 6/9/2025   3:07 PM CDT  To: Janet Cordon MD

## 2025-06-19 NOTE — TELEPHONE ENCOUNTER
RN spoke with patient. Informed of Dr. Cordon's recommendations below. Patient agrees and accepts appointment on 6/23 with Dr. Cordon. Patient aware Dr. Cordon will be on-call this day.

## 2025-06-23 ENCOUNTER — OFFICE VISIT (OUTPATIENT)
Dept: OBGYN CLINIC | Facility: CLINIC | Age: 65
End: 2025-06-23
Payer: COMMERCIAL

## 2025-06-23 VITALS — HEART RATE: 63 BPM | SYSTOLIC BLOOD PRESSURE: 115 MMHG | DIASTOLIC BLOOD PRESSURE: 74 MMHG

## 2025-06-23 DIAGNOSIS — M85.80 OSTEOPENIA DETERMINED BY X-RAY: Primary | ICD-10-CM

## 2025-06-23 PROCEDURE — 99213 OFFICE O/P EST LOW 20 MIN: CPT | Performed by: OBSTETRICS & GYNECOLOGY

## 2025-06-24 NOTE — PATIENT INSTRUCTIONS
Recommend doses of calcium: Women age 50 yrs and less = 1200 mg. Women age 50 yrs or more= 1500 mg     Calcium-Rich Food Sources  Food is the best source of calcium. Dairy products, such as milk, yogurt, and cheese are high in calcium. Certain green vegetables and other foods contain calcium in smaller amounts. Some juices, breakfast foods, soymilk, cereals, snacks, breads and bottled water have added calcium. If you drink soymilk or another liquid that is fortified with calcium, be sure to shake the container well as calcium can settle to the bottom.  A simple way to add calcium to many foods is to add a single tablespoon of nonfat powdered milk, which contains about 50 mg of calcium. It is easy to add a few tablespoons to almost any recipe.    Reading Food Labels - How Much Calcium Am I Getting?  To determine how much calcium is in a particular food, check the nutrition facts panel for the daily value (DV). Food labels list calcium as a percentage of the DV. This amount is based on 1,000 mg of calcium per day. For example:   30% DV of calcium equals 300 mg of calcicm.    20% DV of calcium equals 200 mg of calcium.    15% DV of calcium equals 150 mg of calcium.    A Guide to Calcium-Rich Foods  DIARY and NON- DIARY    Produce Serving Size Estimated Calcium*   Spinach , rajendra greens  1 cup 27 mg   Broccoli or soy beans  1 cup 40 mg   Kale     1 cup 100 mg     Seafood Serving Size Estimated Calcium*   Sardines, canned with bones 3 oz 325 mg   [place] (canned with bones)  3 oz 180 mg     Dairy Serving Size Estimated Calcium*   Ricotta, part-skim   4 oz 335 mg   Yogurt, plain, low-fat   6 oz 310 mg   Milk, skim, low-fat, whole 8 0z 300 mg  KEFIR     8 oz 400 mg   Yogurt with fruit, low-fat  6 oz 260 mg   Mozzarella, part-skim   1 oz 210 mg   Cheddar (harder cheese=more calcium content) 1 oz 205 mg   Yogurt, Greek    6 oz 200 mg   American Cheese   1 oz 195 mg   Feta Cheese    4 oz 140 mg   Cottage Cheese, 2%   4 oz  105 mg   Frozen yogurt, vanilla  8 oz 105 mg   Ice Cream, vanilla   8 oz 85 mg   Parmesan    1 tbsp 55 mg     Fortified Food Serving Size Estimated Calcium*   [place] milk, rice milk, oat milk or soy milk, fortified 8 oz 300 mg * check labels   Orange juice and other fruit juices, fortified   8 oz 300 mg   Tofu, prepared with calcium     4 oz 205 mg   Waffle, frozen, fortified     2 pieces 200 mg   Oatmeal, fortified      1 packet 140 mg   English muffin, fortified     1 muffin 100 mg   Cereal, fortified (varies)     8 oz 100-1,000 mg     Other Serving Size Estimated Calcium*   Mac & cheese 1 package 325 mg   Pizza, cheese 1 serving 115 mg   Pudding, chocolate, prepared with 2% milk 4 oz 160 mg   NUTS- Almonds 1 oz ( 23 almonds) 76 mg   Beans, baked, canned 4 oz 160 mg     Calcium Supplements  The amount of calcium you need from a supplement depends on how much you get from food. Try to get the daily amount recommended from food and only supplement as needed to make up any shortfall. In general, you shouldn’t take supplements that you don’t need. If you get enough calcium from foods, don’t take a supplement. There is no added benefit to taking more calcium than you need. Doing so may even carry some risks (ie kidney stones, or constipation.)    I recommend the calcium calculator from the IOF: http://www.iofbonehealth.org/calcium-calculator    Calcium supplements are available without a prescription in a wide range of preparations (including chewable and liquid) and in different amounts. The best supplement is the one that meets your needs for convenience, cost, and availability. When choosing a supplement, keep the following in mind:   Choose brand-name supplements with proven reliability. Look for labels that state “purified” or have the USP (United States Pharmacopeia) symbol. The “USP Verified Stan” on the supplement label means that the USP has tested and found the calcium supplement to meet its standards for  purity and quality.    Read the product label carefully to determine the amount of elemental calcium, which is the actual amount of calcium in the supplement, as well as how many doses or pills you have to take. When reading the label, pay close attention to the “amount per serving” and “serving size.” (sometimes you may need more than 1 tablet)   Calcium is absorbed best when taken in amounts of 500 - 600 mg or less. This is the case for both foods and supplements. Try to get your calcium-rich foods and/or supplements in small amounts throughout the day, preferably with a meal. While it’s not recommended, taking your calcium all at once is better than not taking it at all. Certain foods such as caffeinated coffee, soda and high salt diets might interfere with calcium absorption, try and avoid simultaneously taking supplements with these foods.   Take (most) calcium supplements with food. Eating food produces stomach acid that helps your body absorb most calcium supplements. The one exception to the rule is calcium citrate, which can absorb well when taken with or without food while you're on the go.    When starting a new calcium supplement, start with a smaller amount to better tolerate it. When switching supplements, try starting with 200-300 mg every day for a week, and drink an extra 6-8 ounces of water with it. Then gradually add more calcium each week.    Side effects from calcium supplements, such as gas or constipation may occur. If so- try increasing fluids (at least 1 liter of water or MORE) and FIBER (ie: more leafy veggies and fruits such as apples/oranges, prunes) If this does not work, try another type or brand of calcium. It may require trial and error to find the right supplement for you, but fortunately there are many choices.    Certain medications such as iron, vitamin C, thyroid medications should not be taken with calcium supplements.   Calcium supplements takes at adequate physiologic doses DO  NOT cause heart disease.     *Adapted from National Osteoporosis Foundation.

## 2025-06-24 NOTE — PROGRESS NOTES
Tasha Hoang is a 64 year old female  No LMP recorded. Patient is postmenopausal.   Chief Complaint   Patient presents with    Test Results     Bone scan done. Hx ankle fracture after falling.   .  History of Present Illness        OBSTETRICS HISTORY:  OB History    Para Term  AB Living   7 7 7 0 0 7   SAB IAB Ectopic Multiple Live Births   0 0 0 0 7       GYNE HISTORY:  Periods none due to menopause    History   Sexual Activity    Sexual activity: Yes       MEDICAL HISTORY:  No past medical history on file.  Past Surgical History:   Procedure Laterality Date    Colonoscopy N/A 2023    Procedure: COLONOSCOPY;  Surgeon: Nayeli De La Paz MD;  Location: [facility] ENDOSCOPY          x7    Upper arm/elbow surgery unlisted Left 1994    arm fracture repair     OB History    Para Term  AB Living   7 7 7 0 0 7   SAB IAB Ectopic Multiple Live Births   0 0 0 0 7        SOCIAL HISTORY:  Social History     Socioeconomic History    Marital status:      Spouse name: Not on file    Number of children: Not on file    Years of education: Not on file    Highest education level: Not on file   Occupational History    Not on file   Tobacco Use    Smoking status: Never    Smokeless tobacco: Never   Vaping Use    Vaping status: Never Used   Substance and Sexual Activity    Alcohol use: No    Drug use: No    Sexual activity: Yes   Other Topics Concern     Service Not Asked    Blood Transfusions Not Asked    Caffeine Concern No    Occupational Exposure Not Asked    Hobby Hazards Not Asked    Sleep Concern Not Asked    Stress Concern Not Asked    Weight Concern Not Asked    Special Diet Not Asked    Back Care Not Asked    Exercise Yes     Comment: Daily stretching/walking    Bike Helmet Not Asked    Seat Belt Not Asked    Self-Exams Not Asked   Social History Narrative    The patient does not use an assistive device..      The patient does live in a home with stairs.     Social Drivers  of Health     Food Insecurity: Not on file   Transportation Needs: Not on file   Stress: Not on file   Housing Stability: Not on file       MEDICATIONS:  No current outpatient medications on file.    ALLERGIES:    Allergies   Allergen Reactions    Sulfa Antibiotics Coughing         Review of Systems:  Constitutional:    denies fatigue, night sweats, hot flashes  Gastrointestinal:  denies abdominal pain, diarrhea or constipation  Genitourinary:    denies dysuria, abnormal vaginal discharge, vaginal itching  Musculoskeletal:   denies back pain.  Neurological:    denies headaches, extremity weakness or numbness.  Psychiatric:   denies depression or anxiety.        PHYSICAL EXAM:   /74   Pulse 63   Constitutional:  well developed, well nourished  Head/Face: normocephalic  Psychiatric:   oriented to time, place, person and situation. Appropriate mood and affect  PROCEDURE: XR DEXA BONE DENSITOMETRY (CPT=77080)     COMPARISON: None.     INDICATIONS: Z87.81 History of ankle fracture     TECHNIQUE: Measurement of bone mineral density of the lumbar spine and hip was performed on a Hologic dual energy x-ray absorptiometry scanner.     FINDINGS:     LEFT FEMORAL NECK  BMD: 0.695 gm/sq. cm. T SCORE: -1.4 Z SCORE: 0.0     LEFT TOTAL HIP  BMD: 0.933 gm/sq. cm. T SCORE: -0.1 Z SCORE: 1.0     PA LUMBAR SPINE (L1 - L4)  BMD: 1.181 gm/sq. cm. T SCORE: 1.2 Z SCORE: 2.8        T scores are a comparison to sex-matched patients with mean peak bone mass and are given in standard deviation (s.d.).  Each 1 s.d. corresponds to approximately 10% below peak normal bone density.       WORLD HEALTH ORGANIZATION CRITERIA  NORMAL T SCORE: Above -1 s.d.    OSTEOPENIA T SCORE: Between -1 and -2.5 s.d.    OSTEOPOROSIS T SCORE: -2.5 s.d.     National Osteoporosis Foundation Clinician's Guide to Prevention and Treatment of Osteoporosis recommendations for treatment:  Post menopausal women and men age 50 and older presenting with the following  should be considered for treatment:  * A hip or vertebral (clinical or morphometric) fracture  * T score < -2.5 at the femoral neck or spine after appropriate evaluation to exclude secondary causes.  * Low bone mass (T score between -1.0 and -2.5 at the femoral neck or spine) and a 10-year probability of a hip fracture > 3% or a 10-year probability of a major osteoporosis-related fracture > 20% based on the [place]-adapted WHO algorithm               Impression   CONCLUSION:     Bone mineral density consistent with osteopenia.     Ten year fracture risk:  Major osteoporotic fracture-5.8%, hip fracture-0.5%.           Dictated by (CST): Adam Casillas MD on 6/09/2025 at 3:03 PM      Finalized by (CST): Adam Casillas MD on 6/09/2025 at 3:06 PM           Results      Assessment & Plan:    Tasha was seen today for test results.    Diagnoses and all orders for this visit:    Osteopenia determined by x-ray        Requested Prescriptions      No prescriptions requested or ordered in this encounter     Assessment & Plan    Reviewed dexa report in detail including pathophys in detail.  Plan for calcium 1500 mg daily (diet + supplements) & weight bearing exercise.  Repeat in 3 years    Spent total time 20 minutes on obtaining history / chart review, evaluating patient / performing medically appropriate exam, discussing treatment options, counseling / educating, and completing documentation, coordinating care.

## 2025-06-26 ENCOUNTER — OFFICE VISIT (OUTPATIENT)
Facility: CLINIC | Age: 65
End: 2025-06-26
Payer: COMMERCIAL

## 2025-06-26 DIAGNOSIS — M54.2 CERVICALGIA: Primary | ICD-10-CM

## 2025-06-26 DIAGNOSIS — M47.22 CERVICAL SPONDYLOSIS WITH RADICULOPATHY: ICD-10-CM

## 2025-06-26 DIAGNOSIS — M47.816 LUMBAR SPONDYLOSIS: ICD-10-CM

## 2025-06-26 DIAGNOSIS — M48.062 LUMBAR STENOSIS WITH NEUROGENIC CLAUDICATION: ICD-10-CM

## 2025-06-26 PROCEDURE — 99214 OFFICE O/P EST MOD 30 MIN: CPT

## 2025-06-26 NOTE — PROGRESS NOTES
[facility]        [facility]      [address]  [address]  [place] [address]    PHONE  [phone]          FAX  [phone]    [email or web address]      OFFICE FOLLOW-UP NOTE            Tasha Hoang    : 1960    MRN: [id number]  CSN: [id number]      PCP: Zoraida Garcia MD  Referring Provider: No ref. provider found    Insurance: Payor: AET[employer] INS / Plan: AETNA POS / Product Type: POS /           Date of Visit: 2025    Reason for Visit:   Chief Complaint    Follow - Up                         History of Present Care:  Tasha Hoang is a a(n) 64 year old, female returns to my office for review of imaging of her cervical spine.  At the last office she was here to touch base in regard to her low back discomfort.  She was scheduled to go under go x-ray and MRI of the cervical spine.  Insurance approved the x-ray but denied the MRI due to limited conservative treatment.  Patient has since been working with physical therapy for her neck.  She continues to endorse a radiating right upper extremity pain through the posterior arm and into the hand with associated weakness in handgrip.  She is also still having low back discomfort and undergoing physical therapy for this.      History:  .  Past Medical History[1]   Past Surgical History[2]   Family History[3]   Social History     Socioeconomic History    Marital status:      Spouse name: Not on file    Number of children: Not on file    Years of education: Not on file    Highest education level: Not on file   Occupational History    Not on file   Tobacco Use    Smoking status: Never    Smokeless tobacco: Never   Vaping Use    Vaping status: Never Used   Substance and Sexual Activity    Alcohol use: No    Drug use: No    Sexual activity: Yes   Other Topics Concern     Service Not Asked    Blood Transfusions Not Asked    Caffeine Concern No     Occupational Exposure Not Asked    Hobby Hazards Not Asked    Sleep Concern Not Asked    Stress Concern Not Asked    Weight Concern Not Asked    Special Diet Not Asked    Back Care Not Asked    Exercise Yes     Comment: Daily stretching/walking    Bike Helmet Not Asked    Seat Belt Not Asked    Self-Exams Not Asked   Social History Narrative    The patient does not use an assistive device..      The patient does live in a home with stairs.     Social Drivers of Health     Food Insecurity: Not on file   Transportation Needs: Not on file   Stress: Not on file   Housing Stability: Not on file        Allergies:  Allergies[4]      Medications:  Current Medications[5]     Review of Systems:  A 10-point system was reviewed.  Pertinent positives and negatives are noted in HPI.      Physical Exam:  There were no vitals taken for this visit.        Neurological Exam:    AAOx3, following commands  PERRLA  EOMI  Face symmetrical  Tongue midline  Hearing symmetrical and intact to finger rub    No rhinorrhea or otorrhea    Romberg negative    Motor Strength:  Right upper extremity handgrip trace weakness    Sensation to light touch:  Intact in bilateral lower extremities throughout    DTRs:  2+ out of 4 in bilateral upper extremities      Abdomen:  Soft, non-distended, non-tender, with no rebound or guarding.  No peritoneal signs.     Extremities:  Non-tender, no lower extremity edema noted.      Labs:  CBC:  Lab Results   Component Value Date    WBC 5.3 01/09/2025    HGB 11.4 (L) 01/09/2025    HCT 37.7 01/09/2025    MCV 71.3 (L) 01/09/2025    .0 01/09/2025      BMG:  Lab Results   Component Value Date     01/09/2025    K 3.9 01/09/2025    CO2 29.0 01/09/2025     01/09/2025    BUN 14 01/09/2025      INR:  No results found for: \"INR\", \"PROTIME\"       Diagnostics:  X-ray cervical spine dated 6/17/2025 reviewed:  Mild degenerative retrolisthesis of C3 relative to C4 and C4 relative to C5.  No change on  flexion-extension views.    Diagnosis:  1. Cervicalgia    2. Lumbar stenosis with neurogenic claudication    3. Lumbar spondylosis    4. Cervical spondylosis with radiculopathy      Assessment/Plan:  Tasha Hoang returns to the office for review of x-ray of the cervical spine.  We reviewed the images which show retrolisthesis of C3 on C4 and C4 on C5.  She has been working on physical therapy in regard to the neck, low back as well as pelvic floor.  She would like to continue working with physical therapy at this time.  She will alert me in the coming weeks as to whether she is feeling improved in regard to the neck and right upper extremity pain she has been experiencing.  At that time we will order an MRI of the cervical spine as she has clear handgrip weakness on exam and symptoms consistent with a C7 radiculopathy.      More than 30 minutes were spent during this visit and the coordination of this patient's care. All questions and concerns were addressed. We appreciate the opportunity to participate in the care of this patient. Please do not hesitate to call our office ([phone]) with any issues.    This note has been dictated utilizing voice recognition software. Unfortunately, this may lead to occasional typographical errors. If there are any questions regarding this, please do not hesitate to contact our office.           Reymundo Grullon PA-C    2025  11:02 AM         [1] No past medical history on file.  [2]   Past Surgical History:  Procedure Laterality Date    Colonoscopy N/A 2023    Procedure: COLONOSCOPY;  Surgeon: Nayeli De La Paz MD;  Location: [facility] ENDOSCOPY          x7    Upper arm/elbow surgery unlisted Left 1994    arm fracture repair   [3]   Family History  Problem Relation Age of Onset    Hypertension Father     Stroke Brother         CVA    Sickle Cell Brother     Heart Disease Other         CAD, family h/o, F, M, S x 2    Breast Cancer Neg     Ovarian Cancer Neg      Pancreatic Cancer Neg     Prostate Cancer Neg    [4]   Allergies  Allergen Reactions    Sulfa Antibiotics Coughing   [5]   No current outpatient medications on file.

## 2025-06-30 ENCOUNTER — MED REC SCAN ONLY (OUTPATIENT)
Dept: INTERNAL MEDICINE CLINIC | Facility: CLINIC | Age: 65
End: 2025-06-30

## 2025-07-14 ENCOUNTER — PATIENT MESSAGE (OUTPATIENT)
Facility: CLINIC | Age: 65
End: 2025-07-14

## 2025-07-14 NOTE — TELEPHONE ENCOUNTER
Message below noted.    Patient requesting referral be sent to insurance for approval after MRI denial. Patient states at time was not under care for six weeks.    No denial information noted previously.    Advised patient to call central scheduling to schedule imaging. Once imaging is scheduled they will initiate authorization process.

## 2025-08-09 ENCOUNTER — HOSPITAL ENCOUNTER (OUTPATIENT)
Dept: MRI IMAGING | Age: 65
Discharge: HOME | End: 2025-08-09

## 2025-08-09 ENCOUNTER — MED REC SCAN ONLY (OUTPATIENT)
Dept: INTERNAL MEDICINE CLINIC | Facility: CLINIC | Age: 65
End: 2025-08-09

## 2025-08-09 DIAGNOSIS — M54.2 CERVICALGIA: ICD-10-CM

## 2025-08-09 PROCEDURE — 72141 MRI NECK SPINE W/O DYE: CPT

## 2025-08-12 ENCOUNTER — OFFICE VISIT (OUTPATIENT)
Dept: SURGERY | Facility: CLINIC | Age: 65
End: 2025-08-12

## 2025-08-12 VITALS — BODY MASS INDEX: 34.66 KG/M2 | HEIGHT: 64 IN | OXYGEN SATURATION: 99 % | HEART RATE: 79 BPM | WEIGHT: 203 LBS

## 2025-08-12 DIAGNOSIS — G95.9 CERVICAL MYELOPATHY (HCC): Primary | ICD-10-CM

## 2025-08-12 DIAGNOSIS — M48.02 SPINAL STENOSIS OF CERVICAL REGION: ICD-10-CM

## 2025-08-12 PROCEDURE — 99214 OFFICE O/P EST MOD 30 MIN: CPT | Performed by: NEUROLOGICAL SURGERY

## 2025-08-19 ENCOUNTER — TELEMEDICINE (OUTPATIENT)
Dept: INTERNAL MEDICINE CLINIC | Facility: CLINIC | Age: 65
End: 2025-08-19

## 2025-08-19 DIAGNOSIS — M47.812 SPONDYLOSIS OF CERVICAL SPINE: Primary | ICD-10-CM

## 2025-08-19 PROCEDURE — 98005 SYNCH AUDIO-VIDEO EST LOW 20: CPT | Performed by: INTERNAL MEDICINE

## (undated) DIAGNOSIS — Z20.822 ENCOUNTER FOR LABORATORY TESTING FOR COVID-19 VIRUS: Primary | ICD-10-CM

## (undated) DEVICE — SNARE OPTMZ PLPCTM TRP

## (undated) DEVICE — Device: Brand: DUAL NARE NASAL CANNULAE FEMALE LUER CON 7FT O2 TUBE

## (undated) DEVICE — SNARE 9MM 230CM 2.4MM EXACTO

## (undated) DEVICE — KIT CLEAN ENDOKIT 1.1OZ GOWNX2

## (undated) DEVICE — KIT ENDO ORCAPOD 160/180/190

## (undated) DEVICE — 60 ML SYRINGE REGULAR TIP: Brand: MONOJECT

## (undated) DEVICE — MEDI-VAC NON-CONDUCTIVE SUCTION TUBING 6MM X 1.8M (6FT.) L: Brand: CARDINAL HEALTH

## (undated) NOTE — LETTER
Date & Time: 7/15/2018, 3:03 PM  Patient: Jamel Saravia  Encounter Provider(s):    PORTIA Denton       To Whom It May Concern:    Astrid Carrasco was seen and treated in our department on 7/15/2018.  She should not return to work until Wednesday, July 1

## (undated) NOTE — LETTER
Dear Employer,  At [facility], we are taking special precautions and doing everything we can to prevent the spread of COVID-19 (coronavirus disease 2019).  During this time, we ask for your assistance regarding physician documentation for empl

## (undated) NOTE — LETTER
8/20/2018              Jose Gates        [address]        [place] [address]         Dear Esthela Caban,    It was a pleasure to see you. Normal mammogram.  Next in one year.  Encouraged to do monthly self breast exams.    There is no need for further

## (undated) NOTE — LETTER
7/24/2018              [address] [place] [address]        [place] [address]         To Whom it may concern: This is to certify that Ingris Carmen had an appointment on 7/24/2018 at 5:43 PM with Adilia Gonzalez DO.   Excuse patient from wo

## (undated) NOTE — LETTER
3/26/2020          To Whom It May Concern:    Dorcas Bettencourt is currently under my medical care and may not return to work at this time. Please excuse Hank Nicki for 7 days. She may return to work on 3/30/2020. Activity is restricted as follows: none.     If

## (undated) NOTE — LETTER
7/17/2018              [facility] [address]         To Whom it may concern: This is to certify that Jazmin Fraser had an appointment on 7/17/2018 at 7:30 PM with Jaleel Mcmahon DO.   May return to work 7/1

## (undated) NOTE — LETTER
8/8/2017              Kailee Ruiz        [address]        [place] [address]         Dear Boris Zapata,      It was a pleasure to see you at our [address] [facility] office.  Normal mammogram.  Next in one year.  E

## (undated) NOTE — MR AVS SNAPSHOT
After Visit Summary   9/7/2019    Juan M Johnson    MRN: [id number]           Visit Information     Date & Time  9/7/2019 11:40 AM Provider  Beltran Pan MD [address] [place] [address], [address] [place] [address],[address], [facility].  Phone  [phone] It is the patient's responsibility to check with and follow their insurance company's guidelines for prior authorization for this test.  You may be held responsible for payment in full if proper authorization is not acquired.   Please contact the Patient Bu including white bread, rice and pasta   Eat plenty of protein, keep the fat content low Sugars:  sodas and sports drinks, candies and desserts   Eat plenty of low-fat dairy products High fat meats and dairy   Choose whole grain products Foods high in sodiu [address]     Treatment for mild  illness or injury that does  not require immediate attention.           Average cost  $70*       VIDEO VISITS  Visit face-to-face with a [facility] physician or [facility]  using your mobile device or computer   using QuicklyChat      [employer]

## (undated) NOTE — LETTER
[address] [place] [address], [place]  Authorization for Invasive Procedure                                                                                           I hereby authorize Codie Hinojosa MD, my physician and his/her assistants (if applicable), which may include medical students, residents, and/or fellows, to perform the following surgical operation/ procedure and administer such anesthesia as may be determined necessary by my physician: Operation/Procedure name (s) COLONOSCOPY on Mely Stone   2. I recognize that during the surgical operation/procedure, unforeseen conditions may necessitate additional or different procedures than those listed above. I, therefore, further authorize and request that the above-named surgeon, assistants, or designees perform such procedures as are, in their judgment, necessary and desirable. 3.   My surgeon/physician has discussed prior to my surgery the potential benefits, risks and side effects of this procedure; the likelihood of achieving goals; and potential problems that might occur during recuperation. They also discussed reasonable alternatives to the procedure, including risks, benefits, and side effects related to the alternatives and risks related to not receiving this procedure. I have had all my questions answered and I acknowledge that no guarantee has been made as to the result that may be obtained. 4.   Should the need arise during my operation/procedure, which includes change of level of care prior to discharge, I also consent to the administration of blood and/or blood products. Further, I understand that despite careful testing and screening of blood or blood products by collecting agencies, I may still be subject to ill effects as a result of receiving a blood transfusion and/or blood products.   The following are some, but not all, of the potential risks that can occur: fever and allergic reactions, hemolytic reactions, transmission of diseases such as Hepatitis, AIDS and Cytomegalovirus (CMV) and fluid overload. In the event that I wish to have an autologous transfusion of my own blood, or a directed donor transfusion, I will discuss this with my physician. Check only if Refusing Blood or Blood Products  I understand refusal of blood or blood products as deemed necessary by my physician may have serious consequences to my condition to include possible death. I hereby assume responsibility for my refusal and release the hospital, its personnel, and my physicians from any responsibility for the consequences of my refusal.    o  Refuse   5. I authorize the use of any specimen, organs, tissues, body parts or foreign objects that may be removed from my body during the operation/procedure for diagnosis, research or teaching purposes and their subsequent disposal by hospital authorities. I also authorize the release of specimen test results and/or written reports to my treating physician on the hospital medical staff or other referring or consulting physicians involved in my care, at the discretion of the Pathologist or my treating physician. 6.   I consent to the photographing or videotaping of the operations or procedures to be performed, including appropriate portions of my body for medical, scientific, or educational purposes, provided my identity is not revealed by the pictures or by descriptive texts accompanying them. If the procedure has been photographed/videotaped, the surgeon will obtain the original picture, image, videotape or CD. The hospital will not be responsible for storage, release or maintenance of the picture, image, tape or CD.    7.   I consent to the presence of a  or observers in the operating room as deemed necessary by my physician or their designees.     8.   I recognize that in the event my procedure results in extended X-Ray/fluoroscopy time, I may develop a skin reaction. 9. If I have a Do Not Attempt Resuscitation (DNAR) order in place, that status will be suspended while in the operating room, procedural suite, and during the recovery period unless otherwise explicitly stated by me (or a person authorized to consent on my behalf). The surgeon or my attending physician will determine when the applicable recovery period ends for purposes of reinstating the DNAR order. 10. Patients having a sterilization procedure: I understand that if the procedure is successful the results will be permanent and it will therefore be impossible for me to inseminate, conceive, or bear children. I also understand that the procedure is intended to result in sterility, although the result has not been guaranteed. 11. I acknowledge that my physician has explained sedation/analgesia administration to me including the risk and benefits I consent to the administration of sedation/analgesia as may be necessary or desirable in the judgment of my physician. I CERTIFY THAT I HAVE READ AND FULLY UNDERSTAND THE ABOVE CONSENT TO OPERATION and/or OTHER PROCEDURE.     _________________________________________ _________________________________     ___________________________________  Signature of Patient     Signature of Responsible Person                   Printed Name of Responsible Person                              _________________________________________ ______________________________        ___________________________________  Signature of Witness         Date  Time         Relationship to Patient    STATEMENT OF PHYSICIAN My signature below affirms that prior to the time of the procedure; I have explained to the patient and/or his/her legal representative, the risks and benefits involved in the proposed treatment and any reasonable alternative to the proposed treatment.  I have also explained the risks and benefits involved in refusal of the proposed treatment and alternatives to the proposed treatment and have answered the patient's questions.  If I have a significant financial interest in a co-management agreement or a significant financial interest in any product or implant, or other significant relationship used in this procedure/surgery, I have disclosed this and had a discussion with my patient.     _______________________________________________________________ _____________________________  Nichole Blake  Physician)                                                                                         (Date)                                   (Time)  Patient Name: Ulisses Jain    : 1960   Printed: 2023      Medical Record #: [id number]                                              Page 1 of 1

## (undated) NOTE — LETTER
4/13/2022          To Whom It May Concern:    Elsa Oh is currently under my medical care and may not return to work at this time. Please excuse José Raker for 3 days. She may return to work on 4/13/2022  . Activity is restricted as follows: none. If you require additional information please contact our office.         Sincerely,    Luis Olsen MD          Document generated by:  Luis Olsen MD